# Patient Record
Sex: FEMALE | Race: WHITE | NOT HISPANIC OR LATINO | Employment: UNEMPLOYED | RURAL
[De-identification: names, ages, dates, MRNs, and addresses within clinical notes are randomized per-mention and may not be internally consistent; named-entity substitution may affect disease eponyms.]

---

## 2017-02-01 ENCOUNTER — HISTORICAL (OUTPATIENT)
Dept: ADMINISTRATIVE | Facility: HOSPITAL | Age: 47
End: 2017-02-01

## 2017-02-03 LAB
LAB AP COMMENTS: NORMAL
LAB AP GENERAL CAT - HISTORICAL: NORMAL
LAB AP INTERPRETATION/RESULT - HISTORICAL: NEGATIVE
LAB AP SPECIMEN ADEQUACY - HISTORICAL: NORMAL
LAB AP SPECIMEN SUBMITTED - HISTORICAL: NORMAL

## 2020-03-16 ENCOUNTER — HISTORICAL (OUTPATIENT)
Dept: ADMINISTRATIVE | Facility: HOSPITAL | Age: 50
End: 2020-03-16

## 2020-03-16 LAB
BASOPHILS # BLD AUTO: 0.09 X10E3/UL (ref 0–0.2)
BASOPHILS NFR BLD AUTO: 0.8 % (ref 0–1)
BILIRUB UR QL STRIP: NEGATIVE MG/DL
BUN SERPL-MCNC: 17 MG/DL (ref 7–18)
CALCIUM SERPL-MCNC: 8.7 MG/DL (ref 8.5–10.1)
CHLORIDE SERPL-SCNC: 103 MMOL/L (ref 98–107)
CLARITY UR: CLEAR
CO2 SERPL-SCNC: 24 MMOL/L (ref 21–32)
COLOR UR: YELLOW
CREAT SERPL-MCNC: 1.15 MG/DL (ref 0.55–1.02)
EOSINOPHIL # BLD AUTO: 0.17 X10E3/UL (ref 0–0.5)
EOSINOPHIL NFR BLD AUTO: 1.5 % (ref 1–4)
ERYTHROCYTE [DISTWIDTH] IN BLOOD BY AUTOMATED COUNT: 12 % (ref 11.5–14.5)
GLUCOSE SERPL-MCNC: 162 MG/DL (ref 74–106)
GLUCOSE UR STRIP-MCNC: NEGATIVE MG/DL
HCT VFR BLD AUTO: 42.4 % (ref 38–47)
HGB BLD-MCNC: 13.9 G/DL (ref 12–16)
IMM GRANULOCYTES # BLD AUTO: 0.08 X10E3/UL (ref 0–0.04)
IMM GRANULOCYTES NFR BLD: 0.7 % (ref 0–0.4)
KETONES UR STRIP-SCNC: NEGATIVE MG/DL
LEUKOCYTE ESTERASE UR QL STRIP: NEGATIVE LEU/UL
LYMPHOCYTES # BLD AUTO: 2.17 X10E3/UL (ref 1–4.8)
LYMPHOCYTES NFR BLD AUTO: 18.8 % (ref 27–41)
MCH RBC QN AUTO: 29.4 PG (ref 27–31)
MCHC RBC AUTO-ENTMCNC: 32.8 G/DL (ref 32–36)
MCV RBC AUTO: 89.6 FL (ref 80–96)
MONOCYTES # BLD AUTO: 0.9 X10E3/UL (ref 0–0.8)
MONOCYTES NFR BLD AUTO: 7.8 % (ref 2–6)
MPC BLD CALC-MCNC: 10.8 FL (ref 9.4–12.4)
NEUTROPHILS # BLD AUTO: 8.16 X10E3/UL (ref 1.8–7.7)
NEUTROPHILS NFR BLD AUTO: 70.4 % (ref 53–65)
NITRITE UR QL STRIP: NEGATIVE
NRBC # BLD AUTO: 0 X10E3/UL (ref 0–0)
NRBC, AUTO (.00): 0 /100 (ref 0–0)
PH UR STRIP: 5.5 PH UNITS (ref 5–8)
PLATELET # BLD AUTO: 254 X10E3/UL (ref 150–400)
POTASSIUM SERPL-SCNC: 3.6 MMOL/L (ref 3.5–5.1)
PROT UR QL STRIP: NEGATIVE MG/DL
RBC # BLD AUTO: 4.73 X10E6/UL (ref 4.2–5.4)
RBC # UR STRIP: ABNORMAL ERY/UL
SODIUM SERPL-SCNC: 136 MMOL/L (ref 136–145)
SP GR UR STRIP: >=1.03 (ref 1–1.03)
UROBILINOGEN UR STRIP-ACNC: 0.2 EU/DL
WBC # BLD AUTO: 11.57 X10E3/UL (ref 4.5–11)

## 2020-05-21 ENCOUNTER — HISTORICAL (OUTPATIENT)
Dept: ADMINISTRATIVE | Facility: HOSPITAL | Age: 50
End: 2020-05-21

## 2020-05-23 LAB
REPORT: NORMAL

## 2020-07-31 ENCOUNTER — HISTORICAL (OUTPATIENT)
Dept: ADMINISTRATIVE | Facility: HOSPITAL | Age: 50
End: 2020-07-31

## 2020-07-31 LAB — SARS-COV+SARS-COV-2 AG RESP QL IA.RAPID: NEGATIVE

## 2020-09-04 ENCOUNTER — HISTORICAL (OUTPATIENT)
Dept: ADMINISTRATIVE | Facility: HOSPITAL | Age: 50
End: 2020-09-04

## 2020-09-04 LAB — SARS-COV+SARS-COV-2 AG RESP QL IA.RAPID: NEGATIVE

## 2021-02-22 ENCOUNTER — HISTORICAL (OUTPATIENT)
Dept: ADMINISTRATIVE | Facility: HOSPITAL | Age: 51
End: 2021-02-22

## 2021-04-19 ENCOUNTER — OFFICE VISIT (OUTPATIENT)
Dept: FAMILY MEDICINE | Facility: CLINIC | Age: 51
End: 2021-04-19
Payer: COMMERCIAL

## 2021-04-19 VITALS
BODY MASS INDEX: 29.15 KG/M2 | SYSTOLIC BLOOD PRESSURE: 121 MMHG | HEART RATE: 81 BPM | OXYGEN SATURATION: 98 % | HEIGHT: 69 IN | RESPIRATION RATE: 17 BRPM | DIASTOLIC BLOOD PRESSURE: 72 MMHG | WEIGHT: 196.81 LBS | TEMPERATURE: 98 F

## 2021-04-19 DIAGNOSIS — J02.9 SORE THROAT: Primary | ICD-10-CM

## 2021-04-19 PROBLEM — M50.20 HERNIATED CERVICAL DISC: Status: ACTIVE | Noted: 2018-05-15

## 2021-04-19 LAB
CTP QC/QA: YES
S PYO RRNA THROAT QL PROBE: NEGATIVE

## 2021-04-19 PROCEDURE — 96372 THER/PROPH/DIAG INJ SC/IM: CPT | Mod: ,,, | Performed by: NURSE PRACTITIONER

## 2021-04-19 PROCEDURE — 99213 OFFICE O/P EST LOW 20 MIN: CPT | Mod: 25,,, | Performed by: NURSE PRACTITIONER

## 2021-04-19 PROCEDURE — 96372 PR INJECTION,THERAP/PROPH/DIAG2ST, IM OR SUBCUT: ICD-10-PCS | Mod: ,,, | Performed by: NURSE PRACTITIONER

## 2021-04-19 PROCEDURE — 87880 POCT RAPID STREP A: ICD-10-PCS | Mod: QW,,, | Performed by: NURSE PRACTITIONER

## 2021-04-19 PROCEDURE — 87880 STREP A ASSAY W/OPTIC: CPT | Mod: QW,,, | Performed by: NURSE PRACTITIONER

## 2021-04-19 PROCEDURE — 99213 PR OFFICE/OUTPT VISIT, EST, LEVL III, 20-29 MIN: ICD-10-PCS | Mod: 25,,, | Performed by: NURSE PRACTITIONER

## 2021-04-19 RX ORDER — DEXAMETHASONE SODIUM PHOSPHATE 4 MG/ML
6 INJECTION, SOLUTION INTRA-ARTICULAR; INTRALESIONAL; INTRAMUSCULAR; INTRAVENOUS; SOFT TISSUE
Status: COMPLETED | OUTPATIENT
Start: 2021-04-19 | End: 2021-04-19

## 2021-04-19 RX ORDER — ESCITALOPRAM OXALATE 20 MG/1
TABLET ORAL
COMMUNITY
Start: 2021-02-15 | End: 2021-11-26

## 2021-04-19 RX ORDER — MELOXICAM 15 MG/1
15 TABLET ORAL DAILY
COMMUNITY

## 2021-04-19 RX ORDER — DULOXETIN HYDROCHLORIDE 30 MG/1
30 CAPSULE, DELAYED RELEASE ORAL DAILY
COMMUNITY
End: 2022-04-25

## 2021-04-19 RX ORDER — GABAPENTIN 100 MG/1
300 CAPSULE ORAL NIGHTLY
COMMUNITY
End: 2022-06-16

## 2021-04-19 RX ORDER — BUSPIRONE HYDROCHLORIDE 7.5 MG/1
TABLET ORAL
COMMUNITY
Start: 2021-02-19 | End: 2021-11-26

## 2021-04-19 RX ORDER — DEXAMETHASONE SODIUM PHOSPHATE 4 MG/ML
6 INJECTION, SOLUTION INTRA-ARTICULAR; INTRALESIONAL; INTRAMUSCULAR; INTRAVENOUS; SOFT TISSUE
Status: DISCONTINUED | OUTPATIENT
Start: 2021-04-19 | End: 2021-04-19

## 2021-04-19 RX ORDER — AMOXICILLIN 500 MG/1
500 CAPSULE ORAL 2 TIMES DAILY
Qty: 20 CAPSULE | Refills: 0 | Status: SHIPPED | OUTPATIENT
Start: 2021-04-19 | End: 2021-11-26

## 2021-04-19 RX ADMIN — DEXAMETHASONE SODIUM PHOSPHATE 6 MG: 4 INJECTION, SOLUTION INTRA-ARTICULAR; INTRALESIONAL; INTRAMUSCULAR; INTRAVENOUS; SOFT TISSUE at 07:04

## 2021-11-06 ENCOUNTER — OFFICE VISIT (OUTPATIENT)
Dept: FAMILY MEDICINE | Facility: CLINIC | Age: 51
End: 2021-11-06
Payer: COMMERCIAL

## 2021-11-06 VITALS
SYSTOLIC BLOOD PRESSURE: 120 MMHG | OXYGEN SATURATION: 98 % | WEIGHT: 188 LBS | TEMPERATURE: 97 F | BODY MASS INDEX: 27.85 KG/M2 | HEIGHT: 69 IN | DIASTOLIC BLOOD PRESSURE: 78 MMHG | HEART RATE: 90 BPM

## 2021-11-06 DIAGNOSIS — J06.9 UPPER RESPIRATORY TRACT INFECTION, UNSPECIFIED TYPE: ICD-10-CM

## 2021-11-06 DIAGNOSIS — Z11.52 ENCOUNTER FOR SCREENING LABORATORY TESTING FOR COVID-19 VIRUS: Primary | ICD-10-CM

## 2021-11-06 DIAGNOSIS — J02.9 SORE THROAT: ICD-10-CM

## 2021-11-06 LAB
CTP QC/QA: YES
CTP QC/QA: YES
FLUAV AG NPH QL: NEGATIVE
FLUBV AG NPH QL: NEGATIVE
S PYO RRNA THROAT QL PROBE: NEGATIVE
SARS-COV-2 AG RESP QL IA.RAPID: NEGATIVE

## 2021-11-06 PROCEDURE — 1159F PR MEDICATION LIST DOCUMENTED IN MEDICAL RECORD: ICD-10-PCS | Mod: ,,, | Performed by: NURSE PRACTITIONER

## 2021-11-06 PROCEDURE — 99051 PR MEDICAL SERVICES, EVE/WKEND/HOLIDAY: ICD-10-PCS | Mod: ,,, | Performed by: NURSE PRACTITIONER

## 2021-11-06 PROCEDURE — 3074F PR MOST RECENT SYSTOLIC BLOOD PRESSURE < 130 MM HG: ICD-10-PCS | Mod: ,,, | Performed by: NURSE PRACTITIONER

## 2021-11-06 PROCEDURE — 87880 STREP A ASSAY W/OPTIC: CPT | Mod: QW,,, | Performed by: NURSE PRACTITIONER

## 2021-11-06 PROCEDURE — 99051 MED SERV EVE/WKEND/HOLIDAY: CPT | Mod: ,,, | Performed by: NURSE PRACTITIONER

## 2021-11-06 PROCEDURE — 87428 SARSCOV & INF VIR A&B AG IA: CPT | Mod: QW,,, | Performed by: NURSE PRACTITIONER

## 2021-11-06 PROCEDURE — 87880 POCT RAPID STREP A: ICD-10-PCS | Mod: QW,,, | Performed by: NURSE PRACTITIONER

## 2021-11-06 PROCEDURE — 96372 PR INJECTION,THERAP/PROPH/DIAG2ST, IM OR SUBCUT: ICD-10-PCS | Mod: ,,, | Performed by: NURSE PRACTITIONER

## 2021-11-06 PROCEDURE — 3074F SYST BP LT 130 MM HG: CPT | Mod: ,,, | Performed by: NURSE PRACTITIONER

## 2021-11-06 PROCEDURE — 1159F MED LIST DOCD IN RCRD: CPT | Mod: ,,, | Performed by: NURSE PRACTITIONER

## 2021-11-06 PROCEDURE — 3078F DIAST BP <80 MM HG: CPT | Mod: ,,, | Performed by: NURSE PRACTITIONER

## 2021-11-06 PROCEDURE — 3078F PR MOST RECENT DIASTOLIC BLOOD PRESSURE < 80 MM HG: ICD-10-PCS | Mod: ,,, | Performed by: NURSE PRACTITIONER

## 2021-11-06 PROCEDURE — 1160F PR REVIEW ALL MEDS BY PRESCRIBER/CLIN PHARMACIST DOCUMENTED: ICD-10-PCS | Mod: ,,, | Performed by: NURSE PRACTITIONER

## 2021-11-06 PROCEDURE — 87428 POCT SARS-COV2 (COVID) WITH FLU ANTIGEN: ICD-10-PCS | Mod: QW,,, | Performed by: NURSE PRACTITIONER

## 2021-11-06 PROCEDURE — 3008F PR BODY MASS INDEX (BMI) DOCUMENTED: ICD-10-PCS | Mod: ,,, | Performed by: NURSE PRACTITIONER

## 2021-11-06 PROCEDURE — 96372 THER/PROPH/DIAG INJ SC/IM: CPT | Mod: ,,, | Performed by: NURSE PRACTITIONER

## 2021-11-06 PROCEDURE — 99213 PR OFFICE/OUTPT VISIT, EST, LEVL III, 20-29 MIN: ICD-10-PCS | Mod: 25,,, | Performed by: NURSE PRACTITIONER

## 2021-11-06 PROCEDURE — 3008F BODY MASS INDEX DOCD: CPT | Mod: ,,, | Performed by: NURSE PRACTITIONER

## 2021-11-06 PROCEDURE — 1160F RVW MEDS BY RX/DR IN RCRD: CPT | Mod: ,,, | Performed by: NURSE PRACTITIONER

## 2021-11-06 PROCEDURE — 99213 OFFICE O/P EST LOW 20 MIN: CPT | Mod: 25,,, | Performed by: NURSE PRACTITIONER

## 2021-11-06 RX ORDER — DEXAMETHASONE SODIUM PHOSPHATE 4 MG/ML
6 INJECTION, SOLUTION INTRA-ARTICULAR; INTRALESIONAL; INTRAMUSCULAR; INTRAVENOUS; SOFT TISSUE
Status: COMPLETED | OUTPATIENT
Start: 2021-11-06 | End: 2021-11-06

## 2021-11-06 RX ADMIN — DEXAMETHASONE SODIUM PHOSPHATE 6 MG: 4 INJECTION, SOLUTION INTRA-ARTICULAR; INTRALESIONAL; INTRAMUSCULAR; INTRAVENOUS; SOFT TISSUE at 11:11

## 2021-11-26 ENCOUNTER — OFFICE VISIT (OUTPATIENT)
Dept: FAMILY MEDICINE | Facility: CLINIC | Age: 51
End: 2021-11-26
Payer: COMMERCIAL

## 2021-11-26 VITALS
RESPIRATION RATE: 18 BRPM | WEIGHT: 188 LBS | TEMPERATURE: 97 F | BODY MASS INDEX: 27.85 KG/M2 | SYSTOLIC BLOOD PRESSURE: 134 MMHG | HEART RATE: 86 BPM | DIASTOLIC BLOOD PRESSURE: 76 MMHG | HEIGHT: 69 IN | OXYGEN SATURATION: 99 %

## 2021-11-26 DIAGNOSIS — J20.9 ACUTE BRONCHITIS, UNSPECIFIED ORGANISM: ICD-10-CM

## 2021-11-26 DIAGNOSIS — Z20.828 CONTACT WITH OR EXPOSURE TO VIRAL DISEASE: Primary | ICD-10-CM

## 2021-11-26 LAB
CTP QC/QA: YES
FLUAV AG NPH QL: NEGATIVE
FLUBV AG NPH QL: NEGATIVE
SARS-COV-2 AG RESP QL IA.RAPID: NEGATIVE

## 2021-11-26 PROCEDURE — 87428 POCT SARS-COV2 (COVID) WITH FLU ANTIGEN: ICD-10-PCS | Mod: QW,,, | Performed by: NURSE PRACTITIONER

## 2021-11-26 PROCEDURE — 96372 THER/PROPH/DIAG INJ SC/IM: CPT | Mod: ,,, | Performed by: NURSE PRACTITIONER

## 2021-11-26 PROCEDURE — 99213 PR OFFICE/OUTPT VISIT, EST, LEVL III, 20-29 MIN: ICD-10-PCS | Mod: 25,,, | Performed by: NURSE PRACTITIONER

## 2021-11-26 PROCEDURE — 96372 PR INJECTION,THERAP/PROPH/DIAG2ST, IM OR SUBCUT: ICD-10-PCS | Mod: ,,, | Performed by: NURSE PRACTITIONER

## 2021-11-26 PROCEDURE — 87428 SARSCOV & INF VIR A&B AG IA: CPT | Mod: QW,,, | Performed by: NURSE PRACTITIONER

## 2021-11-26 PROCEDURE — 99213 OFFICE O/P EST LOW 20 MIN: CPT | Mod: 25,,, | Performed by: NURSE PRACTITIONER

## 2021-11-26 RX ORDER — CEFTRIAXONE 1 G/1
1 INJECTION, POWDER, FOR SOLUTION INTRAMUSCULAR; INTRAVENOUS
Status: COMPLETED | OUTPATIENT
Start: 2021-11-26 | End: 2021-11-26

## 2021-11-26 RX ORDER — CEFDINIR 300 MG/1
300 CAPSULE ORAL 2 TIMES DAILY
Qty: 20 CAPSULE | Refills: 0 | Status: SHIPPED | OUTPATIENT
Start: 2021-11-26 | End: 2021-12-06

## 2021-11-26 RX ORDER — PREDNISONE 20 MG/1
20 TABLET ORAL DAILY
Qty: 5 TABLET | Refills: 0 | Status: SHIPPED | OUTPATIENT
Start: 2021-11-26 | End: 2023-01-21

## 2021-11-26 RX ORDER — DEXAMETHASONE SODIUM PHOSPHATE 4 MG/ML
6 INJECTION, SOLUTION INTRA-ARTICULAR; INTRALESIONAL; INTRAMUSCULAR; INTRAVENOUS; SOFT TISSUE ONCE
Status: COMPLETED | OUTPATIENT
Start: 2021-11-26 | End: 2021-11-26

## 2021-11-26 RX ORDER — CHLOPHEDIANOL HCL AND PYRILAMINE MALEATE 12.5; 12.5 MG/5ML; MG/5ML
10 SOLUTION ORAL 3 TIMES DAILY
Qty: 200 ML | Refills: 0 | Status: SHIPPED | OUTPATIENT
Start: 2021-11-26 | End: 2021-12-01

## 2021-11-26 RX ADMIN — CEFTRIAXONE 1 G: 1 INJECTION, POWDER, FOR SOLUTION INTRAMUSCULAR; INTRAVENOUS at 09:11

## 2021-11-26 RX ADMIN — DEXAMETHASONE SODIUM PHOSPHATE 6 MG: 4 INJECTION, SOLUTION INTRA-ARTICULAR; INTRALESIONAL; INTRAMUSCULAR; INTRAVENOUS; SOFT TISSUE at 09:11

## 2022-02-28 ENCOUNTER — HOSPITAL ENCOUNTER (OUTPATIENT)
Dept: RADIOLOGY | Facility: HOSPITAL | Age: 52
Discharge: HOME | End: 2022-02-28
Payer: COMMERCIAL

## 2022-02-28 VITALS — HEIGHT: 69 IN | BODY MASS INDEX: 27.85 KG/M2 | WEIGHT: 188 LBS

## 2022-02-28 DIAGNOSIS — Z12.31 VISIT FOR SCREENING MAMMOGRAM: ICD-10-CM

## 2022-02-28 PROCEDURE — 77067 SCR MAMMO BI INCL CAD: CPT | Mod: TC

## 2022-04-25 ENCOUNTER — HOSPITAL ENCOUNTER (OUTPATIENT)
Dept: RADIOLOGY | Facility: HOSPITAL | Age: 52
Discharge: HOME | End: 2022-04-25
Attending: ORTHOPAEDIC SURGERY
Payer: COMMERCIAL

## 2022-04-25 DIAGNOSIS — M25.562 ACUTE PAIN OF LEFT KNEE: ICD-10-CM

## 2022-04-25 PROBLEM — M23.92 INTERNAL DERANGEMENT OF LEFT KNEE: Status: ACTIVE | Noted: 2022-04-25

## 2022-04-25 PROCEDURE — 73562 X-RAY EXAM OF KNEE 3: CPT | Mod: TC,LT

## 2022-05-10 ENCOUNTER — OFFICE VISIT (OUTPATIENT)
Dept: FAMILY MEDICINE | Facility: CLINIC | Age: 52
End: 2022-05-10
Payer: COMMERCIAL

## 2022-05-10 VITALS
HEART RATE: 85 BPM | BODY MASS INDEX: 29.18 KG/M2 | DIASTOLIC BLOOD PRESSURE: 79 MMHG | TEMPERATURE: 98 F | OXYGEN SATURATION: 98 % | HEIGHT: 69 IN | WEIGHT: 197 LBS | SYSTOLIC BLOOD PRESSURE: 115 MMHG

## 2022-05-10 DIAGNOSIS — R11.2 NAUSEA AND VOMITING, INTRACTABILITY OF VOMITING NOT SPECIFIED, UNSPECIFIED VOMITING TYPE: ICD-10-CM

## 2022-05-10 DIAGNOSIS — R19.7 DIARRHEA, UNSPECIFIED TYPE: ICD-10-CM

## 2022-05-10 DIAGNOSIS — Z20.828 CONTACT WITH OR EXPOSURE TO VIRAL DISEASE: Primary | ICD-10-CM

## 2022-05-10 PROBLEM — R11.0 NAUSEA: Status: ACTIVE | Noted: 2022-05-10

## 2022-05-10 PROCEDURE — 99051 PR MEDICAL SERVICES, EVE/WKEND/HOLIDAY: ICD-10-PCS | Mod: ,,, | Performed by: NURSE PRACTITIONER

## 2022-05-10 PROCEDURE — 99213 PR OFFICE/OUTPT VISIT, EST, LEVL III, 20-29 MIN: ICD-10-PCS | Mod: ,,, | Performed by: NURSE PRACTITIONER

## 2022-05-10 PROCEDURE — 99213 OFFICE O/P EST LOW 20 MIN: CPT | Mod: ,,, | Performed by: NURSE PRACTITIONER

## 2022-05-10 PROCEDURE — 87428 POCT SARS-COV2 (COVID) WITH FLU ANTIGEN: ICD-10-PCS | Mod: QW,,, | Performed by: NURSE PRACTITIONER

## 2022-05-10 PROCEDURE — 3008F PR BODY MASS INDEX (BMI) DOCUMENTED: ICD-10-PCS | Mod: ,,, | Performed by: NURSE PRACTITIONER

## 2022-05-10 PROCEDURE — 3078F DIAST BP <80 MM HG: CPT | Mod: ,,, | Performed by: NURSE PRACTITIONER

## 2022-05-10 PROCEDURE — S0119 ONDANSETRON 4 MG: HCPCS | Mod: ,,, | Performed by: NURSE PRACTITIONER

## 2022-05-10 PROCEDURE — 3008F BODY MASS INDEX DOCD: CPT | Mod: ,,, | Performed by: NURSE PRACTITIONER

## 2022-05-10 PROCEDURE — 3074F PR MOST RECENT SYSTOLIC BLOOD PRESSURE < 130 MM HG: ICD-10-PCS | Mod: ,,, | Performed by: NURSE PRACTITIONER

## 2022-05-10 PROCEDURE — 99051 MED SERV EVE/WKEND/HOLIDAY: CPT | Mod: ,,, | Performed by: NURSE PRACTITIONER

## 2022-05-10 PROCEDURE — S0119 PR ONDANSETRON, ORAL, 4MG: ICD-10-PCS | Mod: ,,, | Performed by: NURSE PRACTITIONER

## 2022-05-10 PROCEDURE — 3078F PR MOST RECENT DIASTOLIC BLOOD PRESSURE < 80 MM HG: ICD-10-PCS | Mod: ,,, | Performed by: NURSE PRACTITIONER

## 2022-05-10 PROCEDURE — 87428 SARSCOV & INF VIR A&B AG IA: CPT | Mod: QW,,, | Performed by: NURSE PRACTITIONER

## 2022-05-10 PROCEDURE — 3074F SYST BP LT 130 MM HG: CPT | Mod: ,,, | Performed by: NURSE PRACTITIONER

## 2022-05-10 RX ORDER — ONDANSETRON 4 MG/1
4 TABLET, ORALLY DISINTEGRATING ORAL EVERY 6 HOURS PRN
Qty: 20 TABLET | Refills: 0 | Status: SHIPPED | OUTPATIENT
Start: 2022-05-10 | End: 2023-01-21

## 2022-05-10 RX ORDER — ONDANSETRON 4 MG/1
8 TABLET, FILM COATED ORAL ONCE
Status: COMPLETED | OUTPATIENT
Start: 2022-05-10 | End: 2022-05-10

## 2022-05-10 RX ORDER — SERTRALINE HYDROCHLORIDE 50 MG/1
50 TABLET, FILM COATED ORAL DAILY
COMMUNITY
End: 2023-09-08

## 2022-05-10 RX ADMIN — ONDANSETRON 8 MG: 4 TABLET, FILM COATED ORAL at 06:05

## 2022-05-10 NOTE — PROGRESS NOTES
Subjective:       Patient ID: Benita Alston is a 51 y.o. female.    Chief Complaint: Diarrhea, Fever, and Nausea    Nausea, vomiting and diarrhea with low grade fever x day    Review of Systems   Constitutional: Positive for fever. Negative for appetite change and fatigue.   HENT: Negative for nasal congestion, ear pain and sore throat.    Eyes: Negative for pain, discharge and itching.   Respiratory: Negative for cough and shortness of breath.    Cardiovascular: Negative for chest pain and leg swelling.   Gastrointestinal: Positive for diarrhea, nausea and vomiting. Negative for abdominal pain, change in bowel habit and change in bowel habit.   Musculoskeletal: Negative for back pain, gait problem and neck pain.   Integumentary:  Negative for rash and wound.   Allergic/Immunologic: Negative for immunocompromised state.   Neurological: Negative for dizziness, weakness and headaches.   All other systems reviewed and are negative.        Objective:      Physical Exam  Vitals and nursing note reviewed.   Constitutional:       General: She is not in acute distress.     Appearance: Normal appearance. She is not ill-appearing, toxic-appearing or diaphoretic.   HENT:      Head: Normocephalic.      Right Ear: Tympanic membrane, ear canal and external ear normal.      Left Ear: Tympanic membrane, ear canal and external ear normal.      Nose: Nose normal. No congestion or rhinorrhea.      Mouth/Throat:      Mouth: Mucous membranes are moist.      Pharynx: No oropharyngeal exudate or posterior oropharyngeal erythema.   Eyes:      General: No scleral icterus.        Right eye: No discharge.         Left eye: No discharge.      Extraocular Movements: Extraocular movements intact.      Conjunctiva/sclera: Conjunctivae normal.      Pupils: Pupils are equal, round, and reactive to light.   Cardiovascular:      Rate and Rhythm: Normal rate and regular rhythm.      Pulses: Normal pulses.      Heart sounds: Normal heart sounds. No  murmur heard.  Pulmonary:      Effort: Pulmonary effort is normal. No respiratory distress.      Breath sounds: Normal breath sounds. No wheezing, rhonchi or rales.   Abdominal:      General: Bowel sounds are increased.      Tenderness: There is no abdominal tenderness. There is no guarding.   Musculoskeletal:         General: Normal range of motion.      Cervical back: Neck supple. No tenderness.   Lymphadenopathy:      Cervical: No cervical adenopathy.   Skin:     General: Skin is warm and dry.      Capillary Refill: Capillary refill takes less than 2 seconds.      Findings: No rash.   Neurological:      Mental Status: She is alert and oriented to person, place, and time.   Psychiatric:         Mood and Affect: Mood normal.         Behavior: Behavior normal.         Thought Content: Thought content normal.         Judgment: Judgment normal.         Office Visit on 05/10/2022   Component Date Value Ref Range Status    SARS Coronavirus 2 Antigen 05/10/2022 Negative  Negative Final    Rapid Influenza A Ag 05/10/2022 Negative  Negative Final    Rapid Influenza B Ag 05/10/2022 Negative  Negative Final     Acceptable 05/10/2022 Yes   Final   Office Visit on 11/26/2021   Component Date Value Ref Range Status    SARS Coronavirus 2 Antigen 11/26/2021 Negative  Negative Final    Rapid Influenza A Ag 11/26/2021 Negative  Negative Final    Rapid Influenza B Ag 11/26/2021 Negative  Negative Final     Acceptable 11/26/2021 Yes   Final      Assessment:       1. Contact with or exposure to viral disease    2. Nausea and vomiting, intractability of vomiting not specified, unspecified vomiting type    3. Diarrhea, unspecified type        Plan:   Contact with or exposure to viral disease  -     POCT SARS-COV2 (COVID) with Flu Antigen    Nausea and vomiting, intractability of vomiting not specified, unspecified vomiting type  -     ondansetron tablet 8 mg  -     ondansetron (ZOFRAN-ODT) 4 MG  TbDL; Take 1 tablet (4 mg total) by mouth every 6 (six) hours as needed (nausea).  Dispense: 20 tablet; Refill: 0    Diarrhea, unspecified type

## 2022-06-16 ENCOUNTER — OFFICE VISIT (OUTPATIENT)
Dept: RHEUMATOLOGY | Facility: CLINIC | Age: 52
End: 2022-06-16
Payer: COMMERCIAL

## 2022-06-16 VITALS
DIASTOLIC BLOOD PRESSURE: 72 MMHG | HEART RATE: 95 BPM | OXYGEN SATURATION: 97 % | BODY MASS INDEX: 29.39 KG/M2 | RESPIRATION RATE: 16 BRPM | SYSTOLIC BLOOD PRESSURE: 130 MMHG | WEIGHT: 199 LBS

## 2022-06-16 DIAGNOSIS — M54.89 BACK PAIN DUE TO INFLAMMATORY PROCESS: ICD-10-CM

## 2022-06-16 DIAGNOSIS — M50.123 CERVICAL DISC DISORDER AT C6-C7 LEVEL WITH RADICULOPATHY: Primary | ICD-10-CM

## 2022-06-16 PROCEDURE — 1160F PR REVIEW ALL MEDS BY PRESCRIBER/CLIN PHARMACIST DOCUMENTED: ICD-10-PCS | Mod: ,,, | Performed by: INTERNAL MEDICINE

## 2022-06-16 PROCEDURE — 3008F PR BODY MASS INDEX (BMI) DOCUMENTED: ICD-10-PCS | Mod: ,,, | Performed by: INTERNAL MEDICINE

## 2022-06-16 PROCEDURE — 1160F RVW MEDS BY RX/DR IN RCRD: CPT | Mod: ,,, | Performed by: INTERNAL MEDICINE

## 2022-06-16 PROCEDURE — 1159F PR MEDICATION LIST DOCUMENTED IN MEDICAL RECORD: ICD-10-PCS | Mod: ,,, | Performed by: INTERNAL MEDICINE

## 2022-06-16 PROCEDURE — 99205 OFFICE O/P NEW HI 60 MIN: CPT | Mod: S$PBB,,, | Performed by: INTERNAL MEDICINE

## 2022-06-16 PROCEDURE — 1159F MED LIST DOCD IN RCRD: CPT | Mod: ,,, | Performed by: INTERNAL MEDICINE

## 2022-06-16 PROCEDURE — 3075F PR MOST RECENT SYSTOLIC BLOOD PRESS GE 130-139MM HG: ICD-10-PCS | Mod: ,,, | Performed by: INTERNAL MEDICINE

## 2022-06-16 PROCEDURE — 3078F PR MOST RECENT DIASTOLIC BLOOD PRESSURE < 80 MM HG: ICD-10-PCS | Mod: ,,, | Performed by: INTERNAL MEDICINE

## 2022-06-16 PROCEDURE — 3008F BODY MASS INDEX DOCD: CPT | Mod: ,,, | Performed by: INTERNAL MEDICINE

## 2022-06-16 PROCEDURE — 3075F SYST BP GE 130 - 139MM HG: CPT | Mod: ,,, | Performed by: INTERNAL MEDICINE

## 2022-06-16 PROCEDURE — 3078F DIAST BP <80 MM HG: CPT | Mod: ,,, | Performed by: INTERNAL MEDICINE

## 2022-06-16 PROCEDURE — 99205 PR OFFICE/OUTPT VISIT, NEW, LEVL V, 60-74 MIN: ICD-10-PCS | Mod: S$PBB,,, | Performed by: INTERNAL MEDICINE

## 2022-06-16 PROCEDURE — 99214 OFFICE O/P EST MOD 30 MIN: CPT | Mod: PBBFAC | Performed by: INTERNAL MEDICINE

## 2022-06-16 RX ORDER — GABAPENTIN 300 MG/1
300 CAPSULE ORAL NIGHTLY
COMMUNITY
Start: 2022-06-13

## 2022-06-16 NOTE — PROGRESS NOTES
Adali Otero MD   [facility] - RHEUMATOLOGY  [address] [place] [address]  [phone]      PATIENT NAME: Benita Alston  : 1970  DATE: 22  MRN: [id number]      Billing Provider: Adali Otero MD  Level of Service:   Patient PCP Information     Provider PCP Tanya Snyder MD General          Reason for Visit / Chief Complaint: Arthritis (Previously seen by Dr Joshi at [facility] for fibromyalgia and arthritis/)           Assessment and Plan (including Health Maintenance)      Problem List  Smart Sets  Document Outside HM   :    Plan:     Benita Alston is a 46-year-old female with significant cervical neck stiffness.  During our encounter today she had barely moved her neck.  She also has had C6-C7 ACDF.  Cervical radicular symptoms on the left side are somewhat amenable to gabapentin use.  She is on low-dose 300 mg q.h.s..  Have asked her to continue this regimen until she establishes with either Dr. Grace or Dr. Mayers with pain clinic.  I want to investigate for an HLA-b27 driven spondyloarthritis. Will start with angled up SI joint radiographs.   If negative, could consider non-radiographic SpA as dx and consider cimzia since she is responsive to mobic.   APRs pending.       ICD-10-CM ICD-9-CM    1. Cervical disc disorder at C6-C7 level with radiculopathy  M50.123 722.91 Ambulatory referral/consult to Pain Clinic     723.4    2. Back pain due to inflammatory process  M54.89 724.5 HLA-B27 antigen      Hepatitis C Antibody      Hepatitis B Surface Antigen      Hepatitis B Surface Ab, Qualitative      Quantiferon Gold TB      Cyclic Citrullinated Peptide Antibody, IgG      Rheumatoid Quantitative      C-Reactive Protein      Sedimentation Rate      Uric Acid      X-Ray Sacroiliac Joints Complete           Total time spent in Assessment, Co-ordination of Care , Review of Care Plan , Goal Setting and Counseling on this initial visit is ~ 60 minutes                History of Present Illness / Problem Focused Workflow     Benita Alston presents to the clinic with Arthritis (Previously seen by Dr Joshi at [facility] for fibromyalgia and arthritis/)     Worsening back and neck pain for 10 years. Experiences a lot of nerve pain on the left side sometimes radiating from the neck. Has not undergone any neck or epidural injections. Takes gabapentin 300mg QHS. Has been on this dose for a month. Has helped. Prescribed by Chely Morse at [place].   Has also tried mobic which has helped.   2018 underwent c6-7 ACDF in [place]. This did relieve her left sided radicular symptoms greatly.    strength is good.   No weakness.      Review of Systems     Review of Systems   Constitutional: Negative for fever.   Eyes: Negative for redness.   Respiratory: Negative for cough and shortness of breath.    Cardiovascular: Negative for chest pain.   Gastrointestinal: Positive for constipation. Negative for diarrhea.   Genitourinary: Negative for dysuria.   Skin: Negative for rash.   Neurological: Negative for headaches.   Endo/Heme/Allergies: Does not bruise/bleed easily.     Review of Systems   Constitutional: Negative for fever.   Eyes: Negative for redness.   Respiratory: Negative for cough and shortness of breath.    Cardiovascular: Negative for chest pain.   Gastrointestinal: Positive for constipation. Negative for diarrhea.   Genitourinary: Negative for dysuria.   Skin: Negative for rash.   Neurological: Negative for headaches.   Hematological: Does not bruise/bleed easily.       Medical / Social / Family History   History reviewed. No pertinent past medical history.    Past Surgical History:   Procedure Laterality Date    HYSTERECTOMY      SPINE SURGERY         Social History  Ms. Benita Alston  reports that she has never smoked. She has never used smokeless tobacco. She reports that she does not drink alcohol and does not use drugs.    Family History  Ms.'s Benita Alston family  history includes Breast cancer in her mother; Diabetes Mellitus in her father; Hypertension in her mother.    Medications and Allergies     Medications  Outpatient Medications Marked as Taking for the 6/16/22 encounter (Office Visit) with Adali Otero MD   Medication Sig Dispense Refill    gabapentin (NEURONTIN) 300 MG capsule Take 300 mg by mouth nightly.      meloxicam (MOBIC) 15 MG tablet Take 15 mg by mouth once daily.      sertraline (ZOLOFT) 50 MG tablet Take 50 mg by mouth once daily.      [DISCONTINUED] gabapentin (NEURONTIN) 100 MG capsule Take 300 mg by mouth every evening.         Allergies  Review of patient's allergies indicates:  No Known Allergies    Physical Examination     Vitals:    06/16/22 1430   BP: 130/72   Pulse: 95   Resp: 16     Physical Exam  Musculoskeletal:      Comments: Cervical ROM is limited.                 Signature:  Adali Otero MD  [facility] - RHEUMATOLOGY  [address] [facility] [address]  [phone]    Date of encounter: 6/16/22

## 2022-06-21 ENCOUNTER — HOSPITAL ENCOUNTER (OUTPATIENT)
Dept: RADIOLOGY | Facility: HOSPITAL | Age: 52
Discharge: HOME | End: 2022-06-21
Attending: INTERNAL MEDICINE
Payer: COMMERCIAL

## 2022-06-21 DIAGNOSIS — M54.89 BACK PAIN DUE TO INFLAMMATORY PROCESS: ICD-10-CM

## 2022-06-21 PROCEDURE — 72202 XR SACROILIAC JOINTS COMPLETE: ICD-10-PCS | Mod: 26,,, | Performed by: RADIOLOGY

## 2022-06-21 PROCEDURE — 72202 X-RAY EXAM SI JOINTS 3/> VWS: CPT | Mod: TC

## 2022-06-21 PROCEDURE — 72202 X-RAY EXAM SI JOINTS 3/> VWS: CPT | Mod: 26,,, | Performed by: RADIOLOGY

## 2022-07-20 ENCOUNTER — OFFICE VISIT (OUTPATIENT)
Dept: RHEUMATOLOGY | Facility: CLINIC | Age: 52
End: 2022-07-20
Payer: COMMERCIAL

## 2022-07-20 VITALS
HEART RATE: 79 BPM | SYSTOLIC BLOOD PRESSURE: 126 MMHG | DIASTOLIC BLOOD PRESSURE: 74 MMHG | RESPIRATION RATE: 16 BRPM | OXYGEN SATURATION: 97 %

## 2022-07-20 DIAGNOSIS — Z15.89 HLA B27 (HLA B27 POSITIVE): ICD-10-CM

## 2022-07-20 DIAGNOSIS — D84.9 IMMUNOSUPPRESSION: ICD-10-CM

## 2022-07-20 DIAGNOSIS — M45.9 ANKYLOSING SPONDYLITIS, UNSPECIFIED SITE OF SPINE: ICD-10-CM

## 2022-07-20 DIAGNOSIS — M50.123 CERVICAL DISC DISORDER AT C6-C7 LEVEL WITH RADICULOPATHY: Primary | ICD-10-CM

## 2022-07-20 DIAGNOSIS — Z79.899 HIGH RISK MEDICATION USE: Primary | ICD-10-CM

## 2022-07-20 PROCEDURE — 99213 OFFICE O/P EST LOW 20 MIN: CPT | Mod: PBBFAC | Performed by: INTERNAL MEDICINE

## 2022-07-20 PROCEDURE — 1160F RVW MEDS BY RX/DR IN RCRD: CPT | Mod: ,,, | Performed by: INTERNAL MEDICINE

## 2022-07-20 PROCEDURE — 3078F PR MOST RECENT DIASTOLIC BLOOD PRESSURE < 80 MM HG: ICD-10-PCS | Mod: ,,, | Performed by: INTERNAL MEDICINE

## 2022-07-20 PROCEDURE — 1159F MED LIST DOCD IN RCRD: CPT | Mod: ,,, | Performed by: INTERNAL MEDICINE

## 2022-07-20 PROCEDURE — 3078F DIAST BP <80 MM HG: CPT | Mod: ,,, | Performed by: INTERNAL MEDICINE

## 2022-07-20 PROCEDURE — 99215 PR OFFICE/OUTPT VISIT, EST, LEVL V, 40-54 MIN: ICD-10-PCS | Mod: S$PBB,,, | Performed by: INTERNAL MEDICINE

## 2022-07-20 PROCEDURE — 1160F PR REVIEW ALL MEDS BY PRESCRIBER/CLIN PHARMACIST DOCUMENTED: ICD-10-PCS | Mod: ,,, | Performed by: INTERNAL MEDICINE

## 2022-07-20 PROCEDURE — 1159F PR MEDICATION LIST DOCUMENTED IN MEDICAL RECORD: ICD-10-PCS | Mod: ,,, | Performed by: INTERNAL MEDICINE

## 2022-07-20 PROCEDURE — 99215 OFFICE O/P EST HI 40 MIN: CPT | Mod: S$PBB,,, | Performed by: INTERNAL MEDICINE

## 2022-07-20 PROCEDURE — 3074F PR MOST RECENT SYSTOLIC BLOOD PRESSURE < 130 MM HG: ICD-10-PCS | Mod: ,,, | Performed by: INTERNAL MEDICINE

## 2022-07-20 PROCEDURE — 3074F SYST BP LT 130 MM HG: CPT | Mod: ,,, | Performed by: INTERNAL MEDICINE

## 2022-07-20 RX ORDER — METHYLPREDNISOLONE ACETATE 40 MG/ML
40 INJECTION, SUSPENSION INTRA-ARTICULAR; INTRALESIONAL; INTRAMUSCULAR; SOFT TISSUE
Status: DISCONTINUED | OUTPATIENT
Start: 2022-07-20 | End: 2022-07-21

## 2022-07-20 RX ORDER — ADALIMUMAB 40MG/0.4ML
40 KIT SUBCUTANEOUS
Qty: 2 EACH | Refills: 2 | Status: SHIPPED | OUTPATIENT
Start: 2022-07-20 | End: 2022-07-21 | Stop reason: SDUPTHER

## 2022-07-20 NOTE — PROGRESS NOTES
Adali Otero MD   [facility] - RHEUMATOLOGY  [address]  [place] [address]  [phone]      PATIENT NAME: Benita Alston  : 1970  DATE: 22  MRN: [id number]      Billing Provider: Adali Otero MD  Level of Service:   Patient PCP Information     Provider PCP Tanya Snyder MD General          Reason for Visit / Chief Complaint: Follow-up (C/o stiffness in neck and shoulders)           Assessment and Plan (including Health Maintenance)      Problem List  Smart Sets  Document Outside HM   :    Plan:     Benita Alston is a 46-year-old female with significant cervical neck stiffness.  During our encounter today she had barely moved her neck.  She also has had C6-C7 ACDF.  Cervical radicular symptoms on the left side are somewhat amenable to gabapentin use.  She is on low-dose 300 mg q.h.s..  Have asked her to continue this regimen until she establishes with either Dr. Grace or Dr. Mayers with pain clinic.  I want to investigate for an HLA-b27 driven spondyloarthritis. Will start with angled up SI joint radiographs.   If negative, could consider non-radiographic SpA as dx and consider cimzia since she is responsive to mobic.   APRs pending.     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 Patient needs biologic.   Recommended to get Hep B , shingrix and flu shot prior to humira  Screening labs are normal.     1. High risk medication use  Hepatitis B (Recombinant) Adjuvanted, 2 dose    Hepatitis B (Recombinant) Adjuvanted, 2 dose   2. Immunosuppression  Hepatitis B (Recombinant) Adjuvanted, 2 dose    Hepatitis B (Recombinant) Adjuvanted, 2 dose   3. HLA B27 (HLA B27 positive)  adalimumab (HUMIRA,CF,) 40 mg/0.4 mL SyKt   4. Ankylosing  spondylitis, unspecified site of spine  adalimumab (HUMIRA,CF,) 40 mg/0.4 mL SyKt             History of Present Illness / Problem Focused Workflow     Benita Alston presents to the clinic with Follow-up (C/o stiffness in neck and shoulders)     Worsening back and neck pain for 10 years. Experiences a lot of nerve pain on the left side sometimes radiating from the neck. Has not undergone any neck or epidural injections. Takes gabapentin 300mg QHS. Has been on this dose for a month. Has helped. Prescribed by Chely Morse at [place].   Has also tried mobic which has helped.   2018 underwent c6-7 ACDF in [place]. This did relieve her left sided radicular symptoms greatly.    strength is good.   No weakness.      Review of Systems     Review of Systems   Constitutional: Negative for fever.   Eyes: Negative for redness.   Respiratory: Negative for cough and shortness of breath.    Cardiovascular: Negative for chest pain.   Gastrointestinal: Positive for constipation. Negative for diarrhea.   Genitourinary: Negative for dysuria.   Skin: Negative for rash.   Neurological: Negative for headaches.   Endo/Heme/Allergies: Does not bruise/bleed easily.     Review of Systems   Constitutional: Negative for fever.   Eyes: Negative for redness.   Respiratory: Negative for cough and shortness of breath.    Cardiovascular: Negative for chest pain.   Gastrointestinal: Positive for constipation. Negative for diarrhea.   Genitourinary: Negative for dysuria.   Skin: Negative for rash.   Neurological: Negative for headaches.   Hematological: Does not bruise/bleed easily.       Medical / Social / Family History   History reviewed. No pertinent past medical history.    Past Surgical History:   Procedure Laterality Date    HYSTERECTOMY      SPINE SURGERY         Social History  Ms. Benita Alston  reports that she has never smoked. She has never used smokeless tobacco. She reports that she does not drink alcohol and does not use  drugs.    Family History  Ms.'s Joy Marlinton family history includes Breast cancer in her mother; Diabetes Mellitus in her father; Hypertension in her mother.    Medications and Allergies     Medications  Outpatient Medications Marked as Taking for the 7/20/22 encounter (Office Visit) with Adlai Otero MD   Medication Sig Dispense Refill    gabapentin (NEURONTIN) 300 MG capsule Take 300 mg by mouth nightly.      meloxicam (MOBIC) 15 MG tablet Take 15 mg by mouth once daily.      sertraline (ZOLOFT) 50 MG tablet Take 50 mg by mouth once daily.         Allergies  Review of patient's allergies indicates:  No Known Allergies    Physical Examination     Vitals:    07/20/22 1454   BP: 126/74   Pulse: 79   Resp: 16     Physical Exam  Musculoskeletal:      Comments: Cervical ROM is limited.                 Signature:  Adali Otero MD  [facility] - RHEUMATOLOGY  [facility] [address] [place] [address]  [phone]    Date of encounter: 7/20/22

## 2022-07-21 DIAGNOSIS — Z15.89 HLA B27 (HLA B27 POSITIVE): ICD-10-CM

## 2022-07-21 DIAGNOSIS — M45.9 ANKYLOSING SPONDYLITIS, UNSPECIFIED SITE OF SPINE: ICD-10-CM

## 2022-07-21 RX ORDER — ADALIMUMAB 40MG/0.4ML
40 KIT SUBCUTANEOUS
Qty: 2 EACH | Refills: 2 | Status: SHIPPED | OUTPATIENT
Start: 2022-07-21 | End: 2022-08-04

## 2022-07-21 RX ORDER — METHYLPREDNISOLONE ACETATE 40 MG/ML
40 INJECTION, SUSPENSION INTRA-ARTICULAR; INTRALESIONAL; INTRAMUSCULAR; SOFT TISSUE
Status: COMPLETED | OUTPATIENT
Start: 2022-07-20 | End: 2022-07-21

## 2022-07-21 RX ADMIN — METHYLPREDNISOLONE ACETATE 40 MG: 40 INJECTION, SUSPENSION INTRA-ARTICULAR; INTRALESIONAL; INTRAMUSCULAR; SOFT TISSUE at 11:07

## 2022-08-04 DIAGNOSIS — M45.9 ANKYLOSING SPONDYLITIS, UNSPECIFIED SITE OF SPINE: Primary | ICD-10-CM

## 2022-08-04 DIAGNOSIS — Z15.89 HLA B27 (HLA B27 POSITIVE): ICD-10-CM

## 2022-08-04 RX ORDER — ADALIMUMAB 40MG/0.8ML
40 KIT SUBCUTANEOUS
Qty: 2 EACH | Refills: 2 | Status: SHIPPED | OUTPATIENT
Start: 2022-08-04 | End: 2023-01-25

## 2023-01-21 ENCOUNTER — OFFICE VISIT (OUTPATIENT)
Dept: FAMILY MEDICINE | Facility: CLINIC | Age: 53
End: 2023-01-21
Payer: COMMERCIAL

## 2023-01-21 VITALS
HEART RATE: 87 BPM | BODY MASS INDEX: 28.58 KG/M2 | RESPIRATION RATE: 16 BRPM | OXYGEN SATURATION: 98 % | WEIGHT: 193 LBS | TEMPERATURE: 100 F | SYSTOLIC BLOOD PRESSURE: 125 MMHG | DIASTOLIC BLOOD PRESSURE: 83 MMHG | HEIGHT: 69 IN

## 2023-01-21 DIAGNOSIS — R50.9 FEVER, UNSPECIFIED FEVER CAUSE: ICD-10-CM

## 2023-01-21 DIAGNOSIS — R05.9 COUGH, UNSPECIFIED TYPE: ICD-10-CM

## 2023-01-21 DIAGNOSIS — R52 GENERALIZED BODY ACHES: ICD-10-CM

## 2023-01-21 DIAGNOSIS — J01.90 ACUTE NON-RECURRENT SINUSITIS, UNSPECIFIED LOCATION: Primary | ICD-10-CM

## 2023-01-21 PROCEDURE — 1159F MED LIST DOCD IN RCRD: CPT | Mod: ,,, | Performed by: NURSE PRACTITIONER

## 2023-01-21 PROCEDURE — 3079F PR MOST RECENT DIASTOLIC BLOOD PRESSURE 80-89 MM HG: ICD-10-PCS | Mod: ,,, | Performed by: NURSE PRACTITIONER

## 2023-01-21 PROCEDURE — 96372 PR INJECTION,THERAP/PROPH/DIAG2ST, IM OR SUBCUT: ICD-10-PCS | Mod: ,,, | Performed by: NURSE PRACTITIONER

## 2023-01-21 PROCEDURE — 3008F PR BODY MASS INDEX (BMI) DOCUMENTED: ICD-10-PCS | Mod: ,,, | Performed by: NURSE PRACTITIONER

## 2023-01-21 PROCEDURE — 3074F PR MOST RECENT SYSTOLIC BLOOD PRESSURE < 130 MM HG: ICD-10-PCS | Mod: ,,, | Performed by: NURSE PRACTITIONER

## 2023-01-21 PROCEDURE — 99213 PR OFFICE/OUTPT VISIT, EST, LEVL III, 20-29 MIN: ICD-10-PCS | Mod: 25,,, | Performed by: NURSE PRACTITIONER

## 2023-01-21 PROCEDURE — 99051 PR MEDICAL SERVICES, EVE/WKEND/HOLIDAY: ICD-10-PCS | Mod: ,,, | Performed by: NURSE PRACTITIONER

## 2023-01-21 PROCEDURE — 3074F SYST BP LT 130 MM HG: CPT | Mod: ,,, | Performed by: NURSE PRACTITIONER

## 2023-01-21 PROCEDURE — 3008F BODY MASS INDEX DOCD: CPT | Mod: ,,, | Performed by: NURSE PRACTITIONER

## 2023-01-21 PROCEDURE — 96372 THER/PROPH/DIAG INJ SC/IM: CPT | Mod: ,,, | Performed by: NURSE PRACTITIONER

## 2023-01-21 PROCEDURE — 99051 MED SERV EVE/WKEND/HOLIDAY: CPT | Mod: ,,, | Performed by: NURSE PRACTITIONER

## 2023-01-21 PROCEDURE — 87428 POCT SARS-COV2 (COVID) WITH FLU ANTIGEN: ICD-10-PCS | Mod: QW,,, | Performed by: NURSE PRACTITIONER

## 2023-01-21 PROCEDURE — 1159F PR MEDICATION LIST DOCUMENTED IN MEDICAL RECORD: ICD-10-PCS | Mod: ,,, | Performed by: NURSE PRACTITIONER

## 2023-01-21 PROCEDURE — 87428 SARSCOV & INF VIR A&B AG IA: CPT | Mod: QW,,, | Performed by: NURSE PRACTITIONER

## 2023-01-21 PROCEDURE — 99213 OFFICE O/P EST LOW 20 MIN: CPT | Mod: 25,,, | Performed by: NURSE PRACTITIONER

## 2023-01-21 PROCEDURE — 3079F DIAST BP 80-89 MM HG: CPT | Mod: ,,, | Performed by: NURSE PRACTITIONER

## 2023-01-21 RX ORDER — PROMETHAZINE HYDROCHLORIDE AND DEXTROMETHORPHAN HYDROBROMIDE 6.25; 15 MG/5ML; MG/5ML
5 SYRUP ORAL EVERY 6 HOURS PRN
Qty: 240 ML | Refills: 0 | Status: SHIPPED | OUTPATIENT
Start: 2023-01-21 | End: 2023-01-31

## 2023-01-21 RX ORDER — DEXAMETHASONE SODIUM PHOSPHATE 4 MG/ML
8 INJECTION, SOLUTION INTRA-ARTICULAR; INTRALESIONAL; INTRAMUSCULAR; INTRAVENOUS; SOFT TISSUE ONCE
Status: COMPLETED | OUTPATIENT
Start: 2023-01-21 | End: 2023-01-21

## 2023-01-21 RX ORDER — CEFTRIAXONE 1 G/1
1 INJECTION, POWDER, FOR SOLUTION INTRAMUSCULAR; INTRAVENOUS
Status: COMPLETED | OUTPATIENT
Start: 2023-01-21 | End: 2023-01-21

## 2023-01-21 RX ORDER — AMOXICILLIN AND CLAVULANATE POTASSIUM 875; 125 MG/1; MG/1
1 TABLET, FILM COATED ORAL 2 TIMES DAILY
Qty: 20 TABLET | Refills: 0 | Status: SHIPPED | OUTPATIENT
Start: 2023-01-21 | End: 2023-01-31

## 2023-01-21 RX ADMIN — DEXAMETHASONE SODIUM PHOSPHATE 8 MG: 4 INJECTION, SOLUTION INTRA-ARTICULAR; INTRALESIONAL; INTRAMUSCULAR; INTRAVENOUS; SOFT TISSUE at 07:01

## 2023-01-21 RX ADMIN — CEFTRIAXONE 1 G: 1 INJECTION, POWDER, FOR SOLUTION INTRAMUSCULAR; INTRAVENOUS at 07:01

## 2023-01-22 NOTE — PROGRESS NOTES
Subjective:       Patient ID: Benita Alston is a 52 y.o. female.    Chief Complaint: Fever, Generalized Body Aches, Cough, Headache, and Fatigue    Fever, Generalized Body Aches, Cough, Headache, and Fatigue      Fever   Associated symptoms include congestion, coughing and headaches. Pertinent negatives include no abdominal pain, chest pain, ear pain, nausea, rash, sore throat or vomiting.   Cough  Associated symptoms include a fever and headaches. Pertinent negatives include no chest pain, ear pain, rash, sore throat or shortness of breath.   Headache   Associated symptoms include coughing and a fever. Pertinent negatives include no abdominal pain, back pain, dizziness, ear pain, eye pain, nausea, neck pain, sore throat, vomiting or weakness.   Fatigue  Associated symptoms include congestion, coughing, fatigue, a fever and headaches. Pertinent negatives include no abdominal pain, change in bowel habit, chest pain, nausea, neck pain, rash, sore throat, vomiting or weakness.   Review of Systems   Constitutional:  Positive for fatigue and fever. Negative for appetite change.   HENT:  Positive for nasal congestion. Negative for ear pain and sore throat.    Eyes:  Negative for pain, discharge and itching.   Respiratory:  Positive for cough. Negative for shortness of breath.    Cardiovascular:  Negative for chest pain and leg swelling.   Gastrointestinal:  Negative for abdominal pain, change in bowel habit, nausea, vomiting and change in bowel habit.   Musculoskeletal:  Negative for back pain, gait problem and neck pain.        Body aches   Integumentary:  Negative for rash and wound.   Allergic/Immunologic: Negative for immunocompromised state.   Neurological:  Positive for headaches. Negative for dizziness and weakness.   All other systems reviewed and are negative.      Objective:      Physical Exam  Vitals and nursing note reviewed.   Constitutional:       General: She is not in acute distress.     Appearance: Normal  appearance. She is not ill-appearing, toxic-appearing or diaphoretic.   HENT:      Head: Normocephalic.      Right Ear: Tympanic membrane, ear canal and external ear normal.      Left Ear: Tympanic membrane, ear canal and external ear normal.      Nose: Mucosal edema and congestion present. No rhinorrhea.      Right Turbinates: Swollen.      Left Turbinates: Swollen.      Right Sinus: Maxillary sinus tenderness and frontal sinus tenderness present.      Left Sinus: Maxillary sinus tenderness and frontal sinus tenderness present.      Mouth/Throat:      Mouth: Mucous membranes are moist.      Pharynx: No oropharyngeal exudate or posterior oropharyngeal erythema.   Eyes:      General: No scleral icterus.        Right eye: No discharge.         Left eye: No discharge.      Extraocular Movements: Extraocular movements intact.      Conjunctiva/sclera: Conjunctivae normal.      Pupils: Pupils are equal, round, and reactive to light.   Cardiovascular:      Rate and Rhythm: Normal rate and regular rhythm.      Pulses: Normal pulses.      Heart sounds: Normal heart sounds. No murmur heard.  Pulmonary:      Effort: Pulmonary effort is normal. No respiratory distress.      Breath sounds: Normal breath sounds. No wheezing, rhonchi or rales.   Musculoskeletal:         General: Normal range of motion.      Cervical back: Neck supple. No tenderness.   Lymphadenopathy:      Cervical: No cervical adenopathy.   Skin:     General: Skin is warm and dry.      Capillary Refill: Capillary refill takes less than 2 seconds.      Findings: No rash.   Neurological:      Mental Status: She is alert and oriented to person, place, and time.   Psychiatric:         Mood and Affect: Mood normal.         Behavior: Behavior normal.         Thought Content: Thought content normal.         Judgment: Judgment normal.          Assessment:       1. Acute non-recurrent sinusitis, unspecified location    2. Fever, unspecified fever cause    3. Generalized  body aches    4. Cough, unspecified type        Plan:   Acute non-recurrent sinusitis, unspecified location  -     cefTRIAXone injection 1 g  -     dexAMETHasone injection 8 mg  -     amoxicillin-clavulanate 875-125mg (AUGMENTIN) 875-125 mg per tablet; Take 1 tablet by mouth 2 (two) times daily. for 20 doses  Dispense: 20 tablet; Refill: 0    Fever, unspecified fever cause  -     POCT SARS-COV2 (COVID) with Flu Antigen    Generalized body aches  -     POCT SARS-COV2 (COVID) with Flu Antigen    Cough, unspecified type  -     POCT SARS-COV2 (COVID) with Flu Antigen  -     promethazine-dextromethorphan (PROMETHAZINE-DM) 6.25-15 mg/5 mL Syrp; Take 5 mLs by mouth every 6 (six) hours as needed (cough).  Dispense: 240 mL; Refill: 0         Risks, benefits, and side effects were discussed with the patient. All questions were answered to the fullest satisfaction of the patient, and pt verbalized understanding and agreement to treatment plan. Pt was to call with any new or worsening symptoms, or present to the ER

## 2023-02-09 ENCOUNTER — OFFICE VISIT (OUTPATIENT)
Dept: RHEUMATOLOGY | Facility: CLINIC | Age: 53
End: 2023-02-09
Payer: COMMERCIAL

## 2023-02-09 DIAGNOSIS — M45.9 ANKYLOSING SPONDYLITIS, UNSPECIFIED SITE OF SPINE: Primary | ICD-10-CM

## 2023-02-09 DIAGNOSIS — Z15.89 HLA B27 (HLA B27 POSITIVE): ICD-10-CM

## 2023-02-09 DIAGNOSIS — Z15.89 HLA B27 (HLA B27 POSITIVE): Primary | ICD-10-CM

## 2023-02-09 PROCEDURE — 99499 NO LOS: ICD-10-PCS | Mod: 95,,, | Performed by: INTERNAL MEDICINE

## 2023-02-09 PROCEDURE — 1160F PR REVIEW ALL MEDS BY PRESCRIBER/CLIN PHARMACIST DOCUMENTED: ICD-10-PCS | Mod: 95,,, | Performed by: INTERNAL MEDICINE

## 2023-02-09 PROCEDURE — 1159F PR MEDICATION LIST DOCUMENTED IN MEDICAL RECORD: ICD-10-PCS | Mod: 95,,, | Performed by: INTERNAL MEDICINE

## 2023-02-09 PROCEDURE — 99499 UNLISTED E&M SERVICE: CPT | Mod: 95,,, | Performed by: INTERNAL MEDICINE

## 2023-02-09 PROCEDURE — 1159F MED LIST DOCD IN RCRD: CPT | Mod: 95,,, | Performed by: INTERNAL MEDICINE

## 2023-02-09 PROCEDURE — 99215 PR OFFICE/OUTPT VISIT, EST, LEVL V, 40-54 MIN: ICD-10-PCS | Mod: 95,,, | Performed by: INTERNAL MEDICINE

## 2023-02-09 PROCEDURE — 99215 OFFICE O/P EST HI 40 MIN: CPT | Mod: 95,,, | Performed by: INTERNAL MEDICINE

## 2023-02-09 PROCEDURE — 1160F RVW MEDS BY RX/DR IN RCRD: CPT | Mod: 95,,, | Performed by: INTERNAL MEDICINE

## 2023-02-09 NOTE — PROGRESS NOTES
Adali Otero MD   [facility] - RHEUMATOLOGY  [address]  [place] [address]  [phone]      PATIENT NAME: Benita Alston  : 1970  DATE: 23  MRN: [id number]      Billing Provider: Adali Otero MD  Level of Service:   Patient PCP Information       Provider PCP Tanya Snyder MD General            Reason for Visit / Chief Complaint: No chief complaint on file.           Assessment and Plan (including Health Maintenance)      Problem List  Smart Sets  Document Outside HM   :    Plan:     Benita Alston is a 46-year-old female with significant cervical neck stiffness.  During our encounter today she had barely moved her neck.  She also has had C6-C7 ACDF.  Cervical radicular symptoms on the left side are somewhat amenable to gabapentin use.  She is on low-dose 300 mg q.h.s..  Have asked her to continue this regimen until she establishes with either Dr. Grace or Dr. Mayers with pain clinic.  I want to investigate for an HLA-b27 driven spondyloarthritis. Will start with angled up SI joint radiographs.   If negative, could consider non-radiographic SpA as dx and consider cimzia since she is responsive to mobic.   APRs pending.     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 Patient needs biologic.   Recommended to get Hep B , shingrix and flu shot prior to humira  Screening labs are normal.     2023;  Has been on Humira for over 6months and it is not helping.       ICD-10-CM ICD-9-CM    1. Ankylosing spondylitis, unspecified site of spine  M45.9 720.0       2. HLA B27 (HLA B27 positive)  Z15.89 V84.89              1. Ankylosing spondylitis, unspecified site of spine        2. HLA B27 (HLA B27 positive)                    History of Present  Illness / Problem Focused Workflow     Benita Alston presents to the clinic with No chief complaint on file.     Worsening back and neck pain for 10 years. Experiences a lot of nerve pain on the left side sometimes radiating from the neck. Has not undergone any neck or epidural injections. Takes gabapentin 300mg QHS. Has been on this dose for a month. Has helped. Prescribed by Chely Morse at [place].   Has also tried mobic which has helped.   2018 underwent c6-7 ACDF in [place]. This did relieve her left sided radicular symptoms greatly.    strength is good.   No weakness.      Review of Systems     Review of Systems   Constitutional:  Negative for fever.   Eyes:  Negative for redness.   Respiratory:  Negative for cough and shortness of breath.    Cardiovascular:  Negative for chest pain.   Gastrointestinal:  Positive for constipation. Negative for diarrhea.   Genitourinary:  Negative for dysuria.   Skin:  Negative for rash.   Neurological:  Negative for headaches.   Endo/Heme/Allergies:  Does not bruise/bleed easily.   Review of Systems   Constitutional:  Negative for fever.   Eyes:  Negative for redness.   Respiratory:  Negative for cough and shortness of breath.    Cardiovascular:  Negative for chest pain.   Gastrointestinal:  Positive for constipation. Negative for diarrhea.   Genitourinary:  Negative for dysuria.   Skin:  Negative for rash.   Neurological:  Negative for headaches.   Hematological:  Does not bruise/bleed easily.     Medical / Social / Family History   No past medical history on file.    Past Surgical History:   Procedure Laterality Date    HYSTERECTOMY      SPINE SURGERY         Social History  Ms. Benita Alston  reports that she has never smoked. She has never used smokeless tobacco. She reports that she does not drink alcohol and does not use drugs.    Family History  Ms.'le Alston family history includes Breast cancer in her mother; Diabetes Mellitus in her father; Hypertension in her  mother.    Medications and Allergies     Medications  No outpatient medications have been marked as taking for the 2/9/23 encounter (Office Visit) with Adali Otero MD.       Allergies  Review of patient's allergies indicates:  No Known Allergies    Physical Examination     There were no vitals filed for this visit.    Physical Exam  Musculoskeletal:      Comments: Cervical ROM is limited.               Signature:  Adali Otero MD  [facility] RHEUMATOLOGY  [address] [place] [id number]  [phone]    Date of encounter: 2/9/23

## 2023-02-09 NOTE — PROGRESS NOTES
Adali Otero MD   [facility] - RHEUMATOLOGY  [address]  [place] [address]  [phone]      PATIENT NAME: Benita Alston  : 1970  DATE: 23  MRN: [id number]      Billing Provider: Adali Otero MD  Level of Service:   Patient PCP Information       Provider PCP Tanya Snyder MD General            Reason for Visit / Chief Complaint: No chief complaint on file.           Assessment and Plan (including Health Maintenance)      Problem List  Smart Sets  Document Outside HM   :    Plan:     Benita Alston is a 46-year-old female with significant cervical neck stiffness.  During our encounter today she had barely moved her neck.  She also has had C6-C7 ACDF.  Cervical radicular symptoms on the left side are somewhat amenable to gabapentin use.  She is on low-dose 300 mg q.h.s..  Have asked her to continue this regimen until she establishes with either Dr. Grace or Dr. Mayers with pain clinic.  I want to investigate for an HLA-b27 driven spondyloarthritis. Will start with angled up SI joint radiographs.   If negative, could consider non-radiographic SpA as dx and consider cimzia since she is responsive to mobic.   APRs pending.     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 Patient needs biologic.   Recommended to get Hep B , shingrix and flu shot prior to humira  Screening labs are normal.     No diagnosis found.            History of Present Illness / Problem Focused Workflow     Benita Alston presents to the clinic with No chief complaint on file.     Worsening back and neck pain for 10 years. Experiences a lot of nerve pain on the left side sometimes radiating from the neck. Has not undergone any neck or epidural injections. Takes gabapentin  300mg QHS. Has been on this dose for a month. Has helped. Prescribed by Chely Morse at [facility].   Has also tried mobic which has helped.   2018 underwent c6-7 ACDF in [place]. This did relieve her left sided radicular symptoms greatly.    strength is good.   No weakness.      Review of Systems     Review of Systems   Constitutional:  Negative for fever.   Eyes:  Negative for redness.   Respiratory:  Negative for cough and shortness of breath.    Cardiovascular:  Negative for chest pain.   Gastrointestinal:  Positive for constipation. Negative for diarrhea.   Genitourinary:  Negative for dysuria.   Skin:  Negative for rash.   Neurological:  Negative for headaches.   Endo/Heme/Allergies:  Does not bruise/bleed easily.     Review of Systems   Constitutional:  Negative for fever.   Eyes:  Negative for redness.   Respiratory:  Negative for cough and shortness of breath.    Cardiovascular:  Negative for chest pain.   Gastrointestinal:  Positive for constipation. Negative for diarrhea.   Genitourinary:  Negative for dysuria.   Skin:  Negative for rash.   Neurological:  Negative for headaches.   Hematological:  Does not bruise/bleed easily.       Medical / Social / Family History   No past medical history on file.    Past Surgical History:   Procedure Laterality Date    HYSTERECTOMY      SPINE SURGERY         Social History  Ms. Benita Alston  reports that she has never smoked. She has never used smokeless tobacco. She reports that she does not drink alcohol and does not use drugs.    Family History  Ms.'s Benita Alston family history includes Breast cancer in her mother; Diabetes Mellitus in her father; Hypertension in her mother.    Medications and Allergies     Medications  No outpatient medications have been marked as taking for the 2/9/23 encounter (Appointment) with Adali Otero MD.       Allergies  Review of patient's allergies indicates:  No Known Allergies    Physical Examination     There were no vitals  filed for this visit.    Physical Exam  Musculoskeletal:      Comments: Cervical ROM is limited.               Signature:  Adali Otero MD  [facility] RHEUMATOLOGY  [address] [place] [id number]  [phone]    Date of encounter: 2/9/23

## 2023-03-01 ENCOUNTER — SPECIALTY PHARMACY (OUTPATIENT)
Dept: PHARMACY | Facility: CLINIC | Age: 53
End: 2023-03-01

## 2023-03-01 DIAGNOSIS — M45.9 ANKYLOSING SPONDYLITIS, UNSPECIFIED SITE OF SPINE: Primary | ICD-10-CM

## 2023-03-01 RX ORDER — IXEKIZUMAB 80 MG/ML
80 INJECTION, SOLUTION SUBCUTANEOUS WEEKLY
Qty: 2 EACH | Refills: 2 | OUTPATIENT
Start: 2023-03-01 | End: 2023-03-06 | Stop reason: SDUPTHER

## 2023-03-01 RX ORDER — IXEKIZUMAB 80 MG/ML
160 INJECTION, SOLUTION SUBCUTANEOUS ONCE
Qty: 1 EACH | Refills: 0 | Status: SHIPPED | OUTPATIENT
Start: 2023-03-01 | End: 2023-03-01

## 2023-03-01 NOTE — TELEPHONE ENCOUNTER
Renzo MITCHELL is required.  PA submitted for loading dose via [facility]. Key: [id number]   Messaged MDO re: maintenance dose. See vanessa.

## 2023-03-02 NOTE — TELEPHONE ENCOUNTER
Mark, this is Sharmila Collazo with [facility].  We are working on your prescription that your doctor has sent us. We will be working with your insurance to get this approved for you. We will be calling you along the way with updates on your medication.  If you have any questions, you can reach us at [phone].    Welcome call outcome: Left voicemail.

## 2023-03-02 NOTE — TELEPHONE ENCOUNTER
Loading dose PA approved from 1/30/2023 to 03/31/2023  Case ID: [id number]   Maintenance dose PA approved from 1/30/2023 to 08/28/2023  Case ID: [id number]      Patient is locked into [facility].   See ivent- need clarification from MD before able to route out.

## 2023-03-06 DIAGNOSIS — M45.9 ANKYLOSING SPONDYLITIS, UNSPECIFIED SITE OF SPINE: ICD-10-CM

## 2023-03-06 RX ORDER — IXEKIZUMAB 80 MG/ML
80 INJECTION, SOLUTION SUBCUTANEOUS
Qty: 3 EACH | Refills: 2 | Status: SHIPPED | OUTPATIENT
Start: 2023-03-06 | End: 2023-04-03

## 2023-03-06 NOTE — TELEPHONE ENCOUNTER
Updated Rx received from MDO.   Patient locked into [facility].   Routing out RX and closing referral. Thank you for allowing us to participate in this patient's care.

## 2023-03-16 NOTE — TELEPHONE ENCOUNTER
Incoming call- pt asking about status of Taltz. Advised her PA is approved. She is required to fill at [facility].  Pt has [facility] ph #. I also told her about signing up for Taltz copay support.   Pt will call [facility] if any issues at [facility].

## 2023-04-18 ENCOUNTER — HOSPITAL ENCOUNTER (OUTPATIENT)
Dept: RADIOLOGY | Facility: HOSPITAL | Age: 53
Discharge: HOME | End: 2023-04-18
Payer: COMMERCIAL

## 2023-04-18 VITALS — HEIGHT: 69 IN | WEIGHT: 195 LBS | BODY MASS INDEX: 28.88 KG/M2

## 2023-04-18 DIAGNOSIS — Z12.31 BREAST CANCER SCREENING BY MAMMOGRAM: ICD-10-CM

## 2023-04-18 PROCEDURE — 77067 SCR MAMMO BI INCL CAD: CPT | Mod: TC

## 2023-04-20 ENCOUNTER — HOSPITAL ENCOUNTER (OUTPATIENT)
Dept: RADIOLOGY | Facility: HOSPITAL | Age: 53
Discharge: HOME | End: 2023-04-20
Attending: RADIOLOGY
Payer: COMMERCIAL

## 2023-04-20 DIAGNOSIS — R92.8 ABNORMAL FINDING ON RADIOLOGICAL EXAMINATION OF BREAST: ICD-10-CM

## 2023-04-20 PROCEDURE — 77065 DX MAMMO INCL CAD UNI: CPT | Mod: TC,LT

## 2023-04-24 PROBLEM — J01.90 ACUTE NON-RECURRENT SINUSITIS: Status: RESOLVED | Noted: 2023-01-21 | Resolved: 2023-04-24

## 2023-07-05 ENCOUNTER — OFFICE VISIT (OUTPATIENT)
Dept: RHEUMATOLOGY | Facility: CLINIC | Age: 53
End: 2023-07-05
Payer: COMMERCIAL

## 2023-07-05 VITALS
SYSTOLIC BLOOD PRESSURE: 114 MMHG | OXYGEN SATURATION: 99 % | WEIGHT: 195 LBS | DIASTOLIC BLOOD PRESSURE: 76 MMHG | BODY MASS INDEX: 28.88 KG/M2 | RESPIRATION RATE: 18 BRPM | HEART RATE: 76 BPM | HEIGHT: 69 IN | TEMPERATURE: 97 F

## 2023-07-05 DIAGNOSIS — M54.9 DORSALGIA, UNSPECIFIED: ICD-10-CM

## 2023-07-05 DIAGNOSIS — Z79.899 HIGH RISK MEDICATION USE: Primary | ICD-10-CM

## 2023-07-05 PROCEDURE — 3008F PR BODY MASS INDEX (BMI) DOCUMENTED: ICD-10-PCS | Mod: ,,, | Performed by: INTERNAL MEDICINE

## 2023-07-05 PROCEDURE — 3074F PR MOST RECENT SYSTOLIC BLOOD PRESSURE < 130 MM HG: ICD-10-PCS | Mod: ,,, | Performed by: INTERNAL MEDICINE

## 2023-07-05 PROCEDURE — 3074F SYST BP LT 130 MM HG: CPT | Mod: ,,, | Performed by: INTERNAL MEDICINE

## 2023-07-05 PROCEDURE — 99215 OFFICE O/P EST HI 40 MIN: CPT | Mod: S$PBB,,, | Performed by: INTERNAL MEDICINE

## 2023-07-05 PROCEDURE — 1159F PR MEDICATION LIST DOCUMENTED IN MEDICAL RECORD: ICD-10-PCS | Mod: ,,, | Performed by: INTERNAL MEDICINE

## 2023-07-05 PROCEDURE — 3008F BODY MASS INDEX DOCD: CPT | Mod: ,,, | Performed by: INTERNAL MEDICINE

## 2023-07-05 PROCEDURE — 3078F DIAST BP <80 MM HG: CPT | Mod: ,,, | Performed by: INTERNAL MEDICINE

## 2023-07-05 PROCEDURE — 99215 PR OFFICE/OUTPT VISIT, EST, LEVL V, 40-54 MIN: ICD-10-PCS | Mod: S$PBB,,, | Performed by: INTERNAL MEDICINE

## 2023-07-05 PROCEDURE — 99214 OFFICE O/P EST MOD 30 MIN: CPT | Mod: PBBFAC | Performed by: INTERNAL MEDICINE

## 2023-07-05 PROCEDURE — 1159F MED LIST DOCD IN RCRD: CPT | Mod: ,,, | Performed by: INTERNAL MEDICINE

## 2023-07-05 PROCEDURE — 3078F PR MOST RECENT DIASTOLIC BLOOD PRESSURE < 80 MM HG: ICD-10-PCS | Mod: ,,, | Performed by: INTERNAL MEDICINE

## 2023-07-05 RX ORDER — DOCUSATE SODIUM 100 MG/1
100 CAPSULE, LIQUID FILLED ORAL 2 TIMES DAILY
Qty: 60 CAPSULE | Refills: 0 | Status: SHIPPED | OUTPATIENT
Start: 2023-07-05

## 2023-07-05 RX ORDER — PANTOPRAZOLE SODIUM 40 MG/1
40 TABLET, DELAYED RELEASE ORAL
COMMUNITY
Start: 2023-05-30

## 2023-07-05 RX ORDER — CELECOXIB 100 MG/1
100 CAPSULE ORAL 2 TIMES DAILY
Qty: 60 CAPSULE | Refills: 1 | Status: SHIPPED | OUTPATIENT
Start: 2023-07-05 | End: 2023-09-11 | Stop reason: SDUPTHER

## 2023-07-05 RX ORDER — CLORAZEPATE DIPOTASSIUM 3.75 MG/1
TABLET ORAL
COMMUNITY
Start: 2023-05-30

## 2023-07-05 NOTE — PROGRESS NOTES
Adali Otero MD   [facility] - RHEUMATOLOGY  [address] [facility]  [place] [address]  [phone]      PATIENT NAME: Benita Alston  : 1970  DATE: 23  MRN: [id number]      Billing Provider: Adali Otero MD  Level of Service:   Patient PCP Information       Provider PCP Tanya Snyder MD General            Reason for Visit / Chief Complaint: No chief complaint on file.           Assessment and Plan (including Health Maintenance)      Problem List  Smart Sets  Document Outside HM   :    Plan:     Benita Alston is a 46-year-old female with significant cervical neck stiffness.  During our encounter today she had barely moved her neck.  She also has had C6-C7 ACDF.  Cervical radicular symptoms on the left side are somewhat amenable to gabapentin use.  She is on low-dose 300 mg q.h.s..  Have asked her to continue this regimen until she establishes with either Dr. Grace or Dr. Mayers with pain clinic.  I want to investigate for an HLA-b27 driven spondyloarthritis. Will start with angled up SI joint radiographs.   If negative, could consider non-radiographic SpA as dx and consider cimzia since she is responsive to mobic.   APRs pending.     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 Patient needs biologic.   Recommended to get Hep B , shingrix and flu shot prior to humira  Screening labs are normal.     2023;  Has been on Humira for over 6months and it is not helping.     2023;   Started Taltz about 2 months ago.   Neck pain is better than it was. Is not as stiff as she used to be.   FUP with Edda santos to discuss MRI of spine. Refer to Dr. Grace [pain] if + for significant spondylosis.       ICD-10-CM ICD-9-CM    1. High risk medication  use  Z79.899 V58.69 docusate sodium (COLACE) 100 MG capsule      celecoxib (CELEBREX) 100 MG capsule      2. Dorsalgia, unspecified  M54.9 724.5 MRI Lumbar Spine Without Contrast      Comprehensive Metabolic Panel      AST (SGOT)      ALT (SGPT)      CBC Auto Differential                 History of Present Illness / Problem Focused Workflow     Benita Alston presents to the clinic with No chief complaint on file.     Worsening back and neck pain for 10 years. Experiences a lot of nerve pain on the left side sometimes radiating from the neck. Has not undergone any neck or epidural injections. Takes gabapentin 300mg QHS. Has been on this dose for a month. Has helped. Prescribed by Chely Morse at [facility].   Has also tried mobic which has helped.   2018 underwent c6-7 ACDF in [place]. This did relieve her left sided radicular symptoms greatly.    strength is good.   No weakness.      Review of Systems     Review of Systems   Constitutional:  Negative for fever and unexpected weight change.   HENT:  Negative for mouth sores and trouble swallowing.    Eyes:  Negative for redness.   Respiratory:  Negative for cough and shortness of breath.    Cardiovascular:  Negative for chest pain.   Gastrointestinal:  Positive for constipation. Negative for diarrhea.   Genitourinary:  Negative for dysuria and genital sores.   Skin:  Negative for rash.   Neurological:  Negative for headaches.   Endo/Heme/Allergies:  Does not bruise/bleed easily.   Review of Systems   Constitutional:  Negative for fever and unexpected weight change.   HENT:  Negative for mouth sores and trouble swallowing.    Eyes:  Negative for redness.   Respiratory:  Negative for cough and shortness of breath.    Cardiovascular:  Negative for chest pain.   Gastrointestinal:  Positive for constipation. Negative for diarrhea.   Genitourinary:  Negative for dysuria and genital sores.   Skin:  Negative for rash.   Neurological:  Negative for headaches.    Hematological:  Does not bruise/bleed easily.     Medical / Social / Family History   No past medical history on file.    Past Surgical History:   Procedure Laterality Date    HYSTERECTOMY      SPINE SURGERY         Social History  Ms. Benita Alston  reports that she has never smoked. She has never used smokeless tobacco. She reports that she does not drink alcohol and does not use drugs.    Family History  Ms.'le Alston family history includes Breast cancer in her mother; Diabetes Mellitus in her father; Hypertension in her mother.    Medications and Allergies     Medications  No outpatient medications have been marked as taking for the 7/5/23 encounter (Appointment) with Adali Otero MD.       Allergies  Review of patient's allergies indicates:  No Known Allergies    Physical Examination     There were no vitals filed for this visit.    Physical Exam  Musculoskeletal:      Comments: Cervical ROM is limited.               Signature:  Adali Otero MD  [facility] - RHEUMATOLOGY  [address] [place] [address] [place] [address]  [phone]    Date of encounter: 7/5/23

## 2023-08-31 ENCOUNTER — HOSPITAL ENCOUNTER (OUTPATIENT)
Dept: RADIOLOGY | Facility: HOSPITAL | Age: 53
Discharge: HOME | End: 2023-08-31
Attending: INTERNAL MEDICINE
Payer: COMMERCIAL

## 2023-08-31 DIAGNOSIS — M54.9 DORSALGIA, UNSPECIFIED: ICD-10-CM

## 2023-08-31 PROCEDURE — 72148 MRI LUMBAR SPINE W/O DYE: CPT | Mod: 26,,, | Performed by: RADIOLOGY

## 2023-08-31 PROCEDURE — 72148 MRI LUMBAR SPINE WITHOUT CONTRAST: ICD-10-PCS | Mod: 26,,, | Performed by: RADIOLOGY

## 2023-08-31 PROCEDURE — 72148 MRI LUMBAR SPINE W/O DYE: CPT | Mod: TC

## 2023-09-08 RX ORDER — SERTRALINE HYDROCHLORIDE 100 MG/1
100 TABLET, FILM COATED ORAL
COMMUNITY
Start: 2023-08-29

## 2023-09-08 RX ORDER — DIAZEPAM 5 MG/1
TABLET ORAL
COMMUNITY
Start: 2023-08-22 | End: 2023-09-11 | Stop reason: ALTCHOICE

## 2023-09-11 ENCOUNTER — OFFICE VISIT (OUTPATIENT)
Dept: RHEUMATOLOGY | Facility: CLINIC | Age: 53
End: 2023-09-11
Payer: COMMERCIAL

## 2023-09-11 VITALS
DIASTOLIC BLOOD PRESSURE: 72 MMHG | WEIGHT: 195 LBS | HEIGHT: 69 IN | SYSTOLIC BLOOD PRESSURE: 122 MMHG | BODY MASS INDEX: 28.88 KG/M2 | HEART RATE: 65 BPM | OXYGEN SATURATION: 98 % | RESPIRATION RATE: 16 BRPM

## 2023-09-11 DIAGNOSIS — M54.89 BACK PAIN DUE TO INFLAMMATORY PROCESS: ICD-10-CM

## 2023-09-11 DIAGNOSIS — M45.9 ANKYLOSING SPONDYLITIS, UNSPECIFIED SITE OF SPINE: Primary | ICD-10-CM

## 2023-09-11 DIAGNOSIS — Z79.899 HIGH RISK MEDICATION USE: ICD-10-CM

## 2023-09-11 PROCEDURE — 3008F BODY MASS INDEX DOCD: CPT | Mod: ,,, | Performed by: NURSE PRACTITIONER

## 2023-09-11 PROCEDURE — 99999PBSHW PR PBB SHADOW TECHNICAL ONLY FILED TO HB: Mod: PBBFAC,,,

## 2023-09-11 PROCEDURE — 99999PBSHW PR PBB SHADOW TECHNICAL ONLY FILED TO HB: ICD-10-PCS | Mod: PBBFAC,,,

## 2023-09-11 PROCEDURE — 1159F MED LIST DOCD IN RCRD: CPT | Mod: ,,, | Performed by: NURSE PRACTITIONER

## 2023-09-11 PROCEDURE — 1159F PR MEDICATION LIST DOCUMENTED IN MEDICAL RECORD: ICD-10-PCS | Mod: ,,, | Performed by: NURSE PRACTITIONER

## 2023-09-11 PROCEDURE — 99215 OFFICE O/P EST HI 40 MIN: CPT | Mod: S$PBB,25,, | Performed by: NURSE PRACTITIONER

## 2023-09-11 PROCEDURE — 99215 PR OFFICE/OUTPT VISIT, EST, LEVL V, 40-54 MIN: ICD-10-PCS | Mod: S$PBB,25,, | Performed by: NURSE PRACTITIONER

## 2023-09-11 PROCEDURE — 99214 OFFICE O/P EST MOD 30 MIN: CPT | Mod: PBBFAC | Performed by: NURSE PRACTITIONER

## 2023-09-11 PROCEDURE — 3078F DIAST BP <80 MM HG: CPT | Mod: ,,, | Performed by: NURSE PRACTITIONER

## 2023-09-11 PROCEDURE — 3074F PR MOST RECENT SYSTOLIC BLOOD PRESSURE < 130 MM HG: ICD-10-PCS | Mod: ,,, | Performed by: NURSE PRACTITIONER

## 2023-09-11 PROCEDURE — 96372 THER/PROPH/DIAG INJ SC/IM: CPT | Mod: PBBFAC | Performed by: NURSE PRACTITIONER

## 2023-09-11 PROCEDURE — 3074F SYST BP LT 130 MM HG: CPT | Mod: ,,, | Performed by: NURSE PRACTITIONER

## 2023-09-11 PROCEDURE — 3078F PR MOST RECENT DIASTOLIC BLOOD PRESSURE < 80 MM HG: ICD-10-PCS | Mod: ,,, | Performed by: NURSE PRACTITIONER

## 2023-09-11 PROCEDURE — 3008F PR BODY MASS INDEX (BMI) DOCUMENTED: ICD-10-PCS | Mod: ,,, | Performed by: NURSE PRACTITIONER

## 2023-09-11 RX ORDER — CELECOXIB 100 MG/1
100 CAPSULE ORAL 2 TIMES DAILY
Qty: 60 CAPSULE | Refills: 2 | Status: SHIPPED | OUTPATIENT
Start: 2023-09-11 | End: 2023-12-10

## 2023-09-11 RX ORDER — METHYLPREDNISOLONE ACETATE 40 MG/ML
40 INJECTION, SUSPENSION INTRA-ARTICULAR; INTRALESIONAL; INTRAMUSCULAR; SOFT TISSUE
Status: COMPLETED | OUTPATIENT
Start: 2023-09-11 | End: 2023-09-11

## 2023-09-11 RX ADMIN — METHYLPREDNISOLONE ACETATE 40 MG: 40 INJECTION, SUSPENSION INTRA-ARTICULAR; INTRALESIONAL; INTRAMUSCULAR; SOFT TISSUE at 03:09

## 2023-09-11 NOTE — PROGRESS NOTES
"    RHEUMATOLOGY OUTPATIENT CLINIC NOTE    Subjective:       Patient ID: Benita Alston is a 52 y.o. female.    Chief Complaint: Follow-up (Follow up MRI)       History of Present Illness: 51 y/o female presents to the clinic today to follow up on MRI lumbar spine results. MRI notes "degenerative change at L3-4 and L4-5 with spinal canal and neuroforaminal narrowing..". States that has noticed very little difference with back, hip, and shoulder/neck pain while taking Taltz. States that she was previously taking Humira and had no response to the drug; therefore, tried to switch to Cimzia, but had to switch to formulary preferred first. Taltz started in 4/2023 and states she doesn't see much of a difference. States that back pain, hip pain, shoulder pain waxes and wanes and is not every day. States that when her pain is bad enough she will go to local clinic and get steroid injection which helps for about a month or so. Discussed PO Prednisone use. Would prefer to hold off on that for now. Can do low dose Prednisone in the future for short term intervals. States that prior to ACDF cervical surgery was having burning sensation extending down into the left arm and is now having that sensation to the right arm intermittently. Continues to take Gabapentin 300 mg qHS but states cannot tell much of a difference with burning/tingling due to it making her sleepy.     History reviewed. No pertinent past medical history.  Social History     Tobacco Use    Smoking status: Never    Smokeless tobacco: Never   Substance Use Topics    Alcohol use: Never     Review of patient's allergies indicates:  No Known Allergies    Objective:   /72 (BP Location: Left arm, Patient Position: Sitting, BP Method: Large (Manual))   Pulse 65   Resp 16   Ht 5' 9" (1.753 m)   Wt 88.5 kg (195 lb)   SpO2 98%   BMI 28.80 kg/m²     Immunization History   Administered Date(s) Administered    COVID-19, MRNA, LN-S, PF (Pfizer) (Purple Cap) " 07/27/2021, 08/19/2021       Current Outpatient Medications   Medication Instructions    celecoxib (CELEBREX) 100 mg, Oral, 2 times daily    clorazepate (TRANXENE) 3.75 MG Tab TAKE 1 TABLET BY MOUTH 2 TIMES EVERY DAY AS NEEDED    docusate sodium (COLACE) 100 mg, Oral, 2 times daily    gabapentin (NEURONTIN) 300 mg, Oral, Nightly    meloxicam (MOBIC) 15 mg, Oral, Daily    pantoprazole (PROTONIX) 40 mg, Oral    sertraline (ZOLOFT) 100 mg, Oral    TALTZ AUTOINJECTOR, 2 PACK, 80 mg/mL AtIn INJECT 160 MG UNDER THE SKIN ON DAY 1, THEN 80 MG EVERY 28 DAYS      BIOLOGIC LABS  Lab Results   Component Value Date    HEPCAB Non-Reactive 06/21/2022        RHEUM LABS  Lab Results   Component Value Date    CRP <0.29 06/21/2022     Assessment:     Review of Systems   Constitutional:  Negative for fever.   Eyes:  Negative for redness.   Respiratory:  Negative for cough and shortness of breath.    Cardiovascular:  Negative for chest pain.   Gastrointestinal:  Negative for constipation and diarrhea.   Genitourinary:  Negative for dysuria.   Skin:  Negative for rash.   Neurological:  Negative for headaches.   Endo/Heme/Allergies:  Does not bruise/bleed easily.      Physical Exam   Constitutional: No distress.   HENT:   Mouth/Throat: Mucous membranes are moist.   Neck:   ACDF cervical spine by Dr. Edward in [place].    Pulmonary/Chest: Effort normal.   Musculoskeletal:      Cervical back: Decreased range of motion.      Right lower leg: No edema.      Left lower leg: No edema.   Neurological: She is alert.   Psychiatric: Her behavior is normal. Mood normal.   Vitals reviewed.     1. Ankylosing spondylitis, unspecified site of spine    2. Back pain due to inflammatory process    3. High risk medication use      I have reviewed the following:     Details / Date    []   Labs     []   Micro     []   Pathology     [x]   Imaging MRI lumbar spine    []   Cardiology Procedures     [x]   Other Chart Review     Answers submitted by the patient  "for this visit:  Rheumatology Questionnaire (Submitted on 9/11/2023)  mouth sores: No  trouble swallowing: No  unexpected weight change: No  genital sore: No    Plan:     Continue Taltz 80 mg/mL monthly, will look into switching to Cimzia after 6 months of consecutive usage.   CBC, LFTs to be drawn today, no labs prior to visit  Refer to pain management for possible JADEN to lumbar spine  Methylprednisolone 40 mg IM x 1 dose today, patient to call back if "flare" for PO Prednisone  Continue Celebrex 200 mg PO daily     40 minutes of total time spent on the encounter, which includes face to face time and non-face to face time preparing to see the patient (eg, review of tests), Obtaining and/or reviewing separately obtained history, Documenting clinical information in the electronic or other health record, Independently interpreting results (not separately reported) and communicating results to the patient/family/caregiver, or Care coordination (not separately reported).            SHEMAR JosephP  [facility] - RHEUMATOLOGY  [address] [address]  [phone]     "

## 2023-11-09 ENCOUNTER — OFFICE VISIT (OUTPATIENT)
Dept: FAMILY MEDICINE | Facility: CLINIC | Age: 53
End: 2023-11-09
Payer: COMMERCIAL

## 2023-11-09 VITALS
OXYGEN SATURATION: 98 % | TEMPERATURE: 98 F | SYSTOLIC BLOOD PRESSURE: 120 MMHG | WEIGHT: 181.5 LBS | RESPIRATION RATE: 16 BRPM | BODY MASS INDEX: 26.88 KG/M2 | HEIGHT: 69 IN | HEART RATE: 75 BPM | DIASTOLIC BLOOD PRESSURE: 70 MMHG

## 2023-11-09 DIAGNOSIS — R05.9 COUGH, UNSPECIFIED TYPE: ICD-10-CM

## 2023-11-09 DIAGNOSIS — J06.9 ACUTE UPPER RESPIRATORY INFECTION: Primary | ICD-10-CM

## 2023-11-09 DIAGNOSIS — R50.9 FEVER, UNSPECIFIED FEVER CAUSE: ICD-10-CM

## 2023-11-09 LAB
CTP QC/QA: YES
POC MOLECULAR INFLUENZA A AGN: NEGATIVE
POC MOLECULAR INFLUENZA B AGN: NEGATIVE
S PYO RRNA THROAT QL PROBE: NEGATIVE
SARS-COV-2 RDRP RESP QL NAA+PROBE: NEGATIVE

## 2023-11-09 PROCEDURE — 87635: ICD-10-PCS | Mod: QW,,, | Performed by: NURSE PRACTITIONER

## 2023-11-09 PROCEDURE — 3074F SYST BP LT 130 MM HG: CPT | Mod: ,,, | Performed by: NURSE PRACTITIONER

## 2023-11-09 PROCEDURE — 1159F PR MEDICATION LIST DOCUMENTED IN MEDICAL RECORD: ICD-10-PCS | Mod: ,,, | Performed by: NURSE PRACTITIONER

## 2023-11-09 PROCEDURE — 1159F MED LIST DOCD IN RCRD: CPT | Mod: ,,, | Performed by: NURSE PRACTITIONER

## 2023-11-09 PROCEDURE — 87502 INFLUENZA DNA AMP PROBE: CPT | Mod: QW,,, | Performed by: NURSE PRACTITIONER

## 2023-11-09 PROCEDURE — 87635 SARS-COV-2 COVID-19 AMP PRB: CPT | Mod: QW,,, | Performed by: NURSE PRACTITIONER

## 2023-11-09 PROCEDURE — 3008F PR BODY MASS INDEX (BMI) DOCUMENTED: ICD-10-PCS | Mod: ,,, | Performed by: NURSE PRACTITIONER

## 2023-11-09 PROCEDURE — 3008F BODY MASS INDEX DOCD: CPT | Mod: ,,, | Performed by: NURSE PRACTITIONER

## 2023-11-09 PROCEDURE — 87880 STREP A ASSAY W/OPTIC: CPT | Mod: QW,,, | Performed by: NURSE PRACTITIONER

## 2023-11-09 PROCEDURE — 3074F PR MOST RECENT SYSTOLIC BLOOD PRESSURE < 130 MM HG: ICD-10-PCS | Mod: ,,, | Performed by: NURSE PRACTITIONER

## 2023-11-09 PROCEDURE — 3078F PR MOST RECENT DIASTOLIC BLOOD PRESSURE < 80 MM HG: ICD-10-PCS | Mod: ,,, | Performed by: NURSE PRACTITIONER

## 2023-11-09 PROCEDURE — 1160F PR REVIEW ALL MEDS BY PRESCRIBER/CLIN PHARMACIST DOCUMENTED: ICD-10-PCS | Mod: ,,, | Performed by: NURSE PRACTITIONER

## 2023-11-09 PROCEDURE — 87502 CHG INFECTIOUS AGENT DNA/RNA INFLUENZA FIRST 2 TYPES: ICD-10-PCS | Mod: QW,,, | Performed by: NURSE PRACTITIONER

## 2023-11-09 PROCEDURE — 99214 PR OFFICE/OUTPT VISIT, EST, LEVL IV, 30-39 MIN: ICD-10-PCS | Mod: ,,, | Performed by: NURSE PRACTITIONER

## 2023-11-09 PROCEDURE — 1160F RVW MEDS BY RX/DR IN RCRD: CPT | Mod: ,,, | Performed by: NURSE PRACTITIONER

## 2023-11-09 PROCEDURE — 99214 OFFICE O/P EST MOD 30 MIN: CPT | Mod: ,,, | Performed by: NURSE PRACTITIONER

## 2023-11-09 PROCEDURE — 3078F DIAST BP <80 MM HG: CPT | Mod: ,,, | Performed by: NURSE PRACTITIONER

## 2023-11-09 PROCEDURE — 87880 POCT RAPID STREP A: ICD-10-PCS | Mod: QW,,, | Performed by: NURSE PRACTITIONER

## 2023-11-09 RX ORDER — BROMPHENIRAMINE MALEATE, PSEUDOEPHEDRINE HYDROCHLORIDE, AND DEXTROMETHORPHAN HYDROBROMIDE 2; 30; 10 MG/5ML; MG/5ML; MG/5ML
10 SYRUP ORAL EVERY 6 HOURS PRN
Qty: 240 ML | Refills: 0 | Status: SHIPPED | OUTPATIENT
Start: 2023-11-09

## 2023-11-09 RX ORDER — CETIRIZINE HYDROCHLORIDE, PSEUDOEPHEDRINE HYDROCHLORIDE 5; 120 MG/1; MG/1
1 TABLET, FILM COATED, EXTENDED RELEASE ORAL 2 TIMES DAILY
Qty: 24 TABLET | Refills: 0 | Status: SHIPPED | OUTPATIENT
Start: 2023-11-09 | End: 2023-11-11

## 2023-11-09 NOTE — PROGRESS NOTES
"Subjective:       Benita Alston is a 53 y.o. female who presents for evaluation of symptoms of a URI. Symptoms include achiness, cough described as productive, fever 101, and post nasal drip. Onset of symptoms was 1 day ago, and has been gradually worsening since that time. Treatment to date:  ibuprofen .    Review of Systems  Pertinent items are noted in HPI.     Objective:      /70 (BP Location: Left arm, Patient Position: Sitting, BP Method: Large (Manual))   Pulse 75   Temp 97.9 °F (36.6 °C) (Oral)   Resp 16   Ht 5' 9" (1.753 m)   Wt 82.3 kg (181 lb 8 oz)   SpO2 98%   BMI 26.80 kg/m²   General appearance: alert, appears stated age, and cooperative  Head: Normocephalic, without obvious abnormality, atraumatic  Eyes: conjunctivae/corneas clear. PERRL, EOM's intact. Fundi benign.  Ears: abnormal TM right ear - dull and abnormal TM left ear - dull  Nose: Nares normal. Septum midline. Mucosa normal. No drainage or sinus tenderness., moderate congestion  Throat: abnormal findings: mild oropharyngeal erythema and PND  Lungs: clear to auscultation bilaterally  Heart: regular rate and rhythm, S1, S2 normal, no murmur, click, rub or gallop  Extremities: extremities normal, atraumatic, no cyanosis or edema  Skin: Skin color, texture, turgor normal. No rashes or lesions  Lymph nodes: Cervical, supraclavicular, and axillary nodes normal.  Neurologic: Alert and oriented X 3, normal strength and tone. Normal symmetric reflexes. Normal coordination and gait     Assessment:      viral upper respiratory illness     Plan:      Discussed diagnosis and treatment of URI.  Suggested symptomatic OTC remedies.  Nasal saline spray for congestion.  Follow up as needed.   "

## 2023-12-11 ENCOUNTER — PATIENT MESSAGE (OUTPATIENT)
Dept: RHEUMATOLOGY | Facility: CLINIC | Age: 53
End: 2023-12-11
Payer: COMMERCIAL

## 2023-12-11 DIAGNOSIS — M45.9 ANKYLOSING SPONDYLITIS, UNSPECIFIED SITE OF SPINE: ICD-10-CM

## 2023-12-11 RX ORDER — IXEKIZUMAB 80 MG/ML
INJECTION, SOLUTION SUBCUTANEOUS
Qty: 2 ML | Refills: 6 | Status: SHIPPED | OUTPATIENT
Start: 2023-12-11

## 2023-12-11 NOTE — TELEPHONE ENCOUNTER
Fax from [facility] requesting refill of Taltz. Please advise, looks like we were possibly going to be switching pt to Cimzia after 6 months of Taltz use

## 2024-04-23 ENCOUNTER — HOSPITAL ENCOUNTER (OUTPATIENT)
Dept: RADIOLOGY | Facility: HOSPITAL | Age: 54
Discharge: HOME | End: 2024-04-23
Attending: OBSTETRICS & GYNECOLOGY
Payer: COMMERCIAL

## 2024-04-23 VITALS — HEIGHT: 69 IN | WEIGHT: 185 LBS | BODY MASS INDEX: 27.4 KG/M2

## 2024-04-23 DIAGNOSIS — Z12.31 BREAST CANCER SCREENING BY MAMMOGRAM: ICD-10-CM

## 2024-04-23 PROCEDURE — 77067 SCR MAMMO BI INCL CAD: CPT | Mod: TC

## 2024-12-10 ENCOUNTER — OFFICE VISIT (OUTPATIENT)
Dept: FAMILY MEDICINE | Facility: CLINIC | Age: 54
End: 2024-12-10
Payer: COMMERCIAL

## 2024-12-10 VITALS
SYSTOLIC BLOOD PRESSURE: 137 MMHG | HEIGHT: 69 IN | BODY MASS INDEX: 28.84 KG/M2 | WEIGHT: 194.69 LBS | OXYGEN SATURATION: 98 % | HEART RATE: 71 BPM | DIASTOLIC BLOOD PRESSURE: 82 MMHG

## 2024-12-10 DIAGNOSIS — Z15.89 HLA-B27 POSITIVE: ICD-10-CM

## 2024-12-10 DIAGNOSIS — M45.9 ANKYLOSING SPONDYLITIS, UNSPECIFIED SITE OF SPINE: Primary | ICD-10-CM

## 2024-12-10 PROCEDURE — 1159F MED LIST DOCD IN RCRD: CPT | Mod: ,,, | Performed by: NURSE PRACTITIONER

## 2024-12-10 PROCEDURE — 99213 OFFICE O/P EST LOW 20 MIN: CPT | Mod: 25,,, | Performed by: NURSE PRACTITIONER

## 2024-12-10 PROCEDURE — 3008F BODY MASS INDEX DOCD: CPT | Mod: ,,, | Performed by: NURSE PRACTITIONER

## 2024-12-10 PROCEDURE — 3075F SYST BP GE 130 - 139MM HG: CPT | Mod: ,,, | Performed by: NURSE PRACTITIONER

## 2024-12-10 PROCEDURE — 3079F DIAST BP 80-89 MM HG: CPT | Mod: ,,, | Performed by: NURSE PRACTITIONER

## 2024-12-10 PROCEDURE — 1160F RVW MEDS BY RX/DR IN RCRD: CPT | Mod: ,,, | Performed by: NURSE PRACTITIONER

## 2024-12-10 PROCEDURE — 96372 THER/PROPH/DIAG INJ SC/IM: CPT | Mod: ,,, | Performed by: NURSE PRACTITIONER

## 2024-12-10 RX ORDER — METHYLPREDNISOLONE ACETATE 40 MG/ML
40 INJECTION, SUSPENSION INTRA-ARTICULAR; INTRALESIONAL; INTRAMUSCULAR; SOFT TISSUE
Status: COMPLETED | OUTPATIENT
Start: 2024-12-10 | End: 2024-12-10

## 2024-12-10 RX ORDER — DEXAMETHASONE SODIUM PHOSPHATE 4 MG/ML
4 INJECTION, SOLUTION INTRA-ARTICULAR; INTRALESIONAL; INTRAMUSCULAR; INTRAVENOUS; SOFT TISSUE
Status: COMPLETED | OUTPATIENT
Start: 2024-12-10 | End: 2024-12-10

## 2024-12-10 RX ADMIN — DEXAMETHASONE SODIUM PHOSPHATE 4 MG: 4 INJECTION, SOLUTION INTRA-ARTICULAR; INTRALESIONAL; INTRAMUSCULAR; INTRAVENOUS; SOFT TISSUE at 01:12

## 2024-12-10 RX ADMIN — METHYLPREDNISOLONE ACETATE 40 MG: 40 INJECTION, SUSPENSION INTRA-ARTICULAR; INTRALESIONAL; INTRAMUSCULAR; SOFT TISSUE at 01:12

## 2024-12-10 NOTE — PROGRESS NOTES
Clinic note     Patient name: Benita Alston is a 54 y.o. female   Chief compliant   Chief Complaint   Patient presents with    Arthritis     AS arthritis flare up. Is asking for a steroid shot.        Subjective     History of present illness   PCP: ANNA Wilson   In clinic for evaluation of arthritis flare up with lower back and bilateral hip pain which started a couple weeks ago   Request IM steroid which is usually effective     Past Medical History: GERD, anxiety, ankylosing spondylitis HLA-B27 positive     Previously followed by Dr Otero for rheumatology   Currently followed by ANNA Mccoy              Social History     Tobacco Use    Smoking status: Never    Smokeless tobacco: Never   Substance Use Topics    Alcohol use: Never    Drug use: Never       Review of patient's allergies indicates:  No Known Allergies    Past Medical History:   Diagnosis Date    Ankylosing spondylitis 09/11/2023    GERD (gastroesophageal reflux disease)        Past Surgical History:   Procedure Laterality Date    HYSTERECTOMY      SPINE SURGERY      TUBAL LIGATION          Family History   Problem Relation Name Age of Onset    Breast cancer Mother Kayla Fortune     Hypertension Mother Kayla Fortune     Cancer Mother Kayla Fortune     Diabetes Mellitus Father FAN Fortune     Diabetes Father FAN Fortune     Arthritis Maternal Grandfather [place]          Current Outpatient Medications:     clorazepate (TRANXENE) 3.75 MG Tab, TAKE 1 TABLET BY MOUTH 2 TIMES EVERY DAY AS NEEDED, Disp: , Rfl:     gabapentin (NEURONTIN) 300 MG capsule, Take 300 mg by mouth nightly., Disp: , Rfl:     meloxicam (MOBIC) 15 MG tablet, Take 15 mg by mouth once daily., Disp: , Rfl:     pantoprazole (PROTONIX) 40 MG tablet, Take 40 mg by mouth., Disp: , Rfl:     sertraline (ZOLOFT) 100 MG tablet, Take 100 mg by mouth., Disp: , Rfl:     Current Facility-Administered Medications:     dexAMETHasone injection 4 mg, 4 mg, Intramuscular, 1 time in Clinic/HOD, Yris Patel  "NETTIE FNP    methylPREDNISolone acetate injection 40 mg, 40 mg, Intramuscular, 1 time in Clinic/HOD, Yris Patel NETTIE, FNP    Review of Systems   Constitutional:  Negative for appetite change, chills, fatigue, fever and unexpected weight change.   Respiratory:  Negative for cough and shortness of breath.    Cardiovascular:  Negative for chest pain, palpitations and leg swelling.   Gastrointestinal:  Negative for abdominal pain, change in bowel habit, constipation, diarrhea, nausea and vomiting.   Genitourinary:  Negative for dysuria and frequency.   Musculoskeletal:  Positive for arthralgias and back pain. Negative for gait problem and myalgias.   Neurological:  Negative for dizziness, syncope, light-headedness and headaches.   Psychiatric/Behavioral:  Negative for dysphoric mood and sleep disturbance. The patient is not nervous/anxious.        Objective     /82   Pulse 71   Ht 5' 9" (1.753 m)   Wt 88.3 kg (194 lb 11.2 oz)   SpO2 98%   BMI 28.75 kg/m²     Physical Exam   Constitutional: She is oriented to person, place, and time. No distress.   HENT:   Head: Atraumatic.   Mouth/Throat: Mucous membranes are moist.   Cardiovascular: Normal rate and regular rhythm. Pulmonary:      Effort: Pulmonary effort is normal. No respiratory distress.      Breath sounds: Normal breath sounds. No wheezing, rhonchi or rales.     Abdominal: Soft. Bowel sounds are normal. She exhibits no distension. There is no abdominal tenderness.   Musculoskeletal:         General: Normal range of motion.      Cervical back: Neck supple.      Right lower leg: No edema.      Left lower leg: No edema.   Neurological: She is alert and oriented to person, place, and time. Gait normal.   Skin: Skin is warm and dry.   Psychiatric: Her behavior is normal. Mood normal.       Lab Results   Component Value Date    WBC 9.21 09/11/2023    HGB 13.5 09/11/2023    HCT 39.7 09/11/2023    MCV 85.9 09/11/2023     09/11/2023       CMP  Sodium   Date " "Value Ref Range Status   07/05/2023 139 136 - 145 mmol/L Final     Potassium   Date Value Ref Range Status   07/05/2023 3.6 3.5 - 5.1 mmol/L Final     Chloride   Date Value Ref Range Status   07/05/2023 106 98 - 107 mmol/L Final     CO2   Date Value Ref Range Status   07/05/2023 28 21 - 32 mmol/L Final     Glucose   Date Value Ref Range Status   07/05/2023 80 74 - 106 mg/dL Final     BUN   Date Value Ref Range Status   07/05/2023 8 7 - 18 mg/dL Final     Creatinine   Date Value Ref Range Status   07/05/2023 0.89 0.55 - 1.02 mg/dL Final     Calcium   Date Value Ref Range Status   07/05/2023 8.7 8.5 - 10.1 mg/dL Final     Total Protein   Date Value Ref Range Status   07/05/2023 7.3 6.4 - 8.2 g/dL Final     Albumin   Date Value Ref Range Status   07/05/2023 3.8 3.5 - 5.0 g/dL Final     Bilirubin, Total   Date Value Ref Range Status   07/05/2023 0.7 >0.0 - 1.2 mg/dL Final     Alk Phos   Date Value Ref Range Status   07/05/2023 87 41 - 108 U/L Final     AST   Date Value Ref Range Status   09/11/2023 21 15 - 37 U/L Final     ALT   Date Value Ref Range Status   09/11/2023 29 13 - 56 U/L Final     Anion Gap   Date Value Ref Range Status   07/05/2023 9 7 - 16 mmol/L Final     eGFR    Date Value Ref Range Status   03/16/2020 65       Comment:     The above 10 analytes were performed by [id number] [place] [id number]     eGFR   Date Value Ref Range Status   03/16/2020 54       No results found for: "TSH"  No results found for: "CHOL"  No results found for: "HDL"  No results found for: "LDLCALC"  No results found for: "TRIG"  No results found for: "CHOLHDL"  No results found for: "LABA1C", "HGBA1C"      Assessment and Plan   Ankylosing spondylitis, unspecified site of spine  -     methylPREDNISolone acetate injection 40 mg  -     dexAMETHasone injection 4 mg    HLA-B27 positive  -     methylPREDNISolone acetate injection 40 mg  -     dexAMETHasone injection 4 mg          Patient " Instructions  Patient Instructions   Follow up in clinic as needed

## 2025-04-29 ENCOUNTER — HOSPITAL ENCOUNTER (OUTPATIENT)
Dept: RADIOLOGY | Facility: HOSPITAL | Age: 55
Discharge: HOME | End: 2025-04-29
Attending: NURSE PRACTITIONER
Payer: COMMERCIAL

## 2025-04-29 DIAGNOSIS — Z12.31 OTHER SCREENING MAMMOGRAM: ICD-10-CM

## 2025-04-29 PROCEDURE — 77063 BREAST TOMOSYNTHESIS BI: CPT | Mod: 26,,, | Performed by: RADIOLOGY

## 2025-04-29 PROCEDURE — 77067 SCR MAMMO BI INCL CAD: CPT | Mod: 26,,, | Performed by: RADIOLOGY

## 2025-04-29 PROCEDURE — 77063 BREAST TOMOSYNTHESIS BI: CPT | Mod: TC

## 2025-05-04 ENCOUNTER — OFFICE VISIT (OUTPATIENT)
Dept: FAMILY MEDICINE | Facility: CLINIC | Age: 55
End: 2025-05-04
Payer: COMMERCIAL

## 2025-05-04 VITALS
RESPIRATION RATE: 18 BRPM | SYSTOLIC BLOOD PRESSURE: 141 MMHG | HEIGHT: 69 IN | BODY MASS INDEX: 29.33 KG/M2 | HEART RATE: 88 BPM | OXYGEN SATURATION: 96 % | WEIGHT: 198 LBS | TEMPERATURE: 98 F | DIASTOLIC BLOOD PRESSURE: 65 MMHG

## 2025-05-04 DIAGNOSIS — R05.1 ACUTE COUGH: ICD-10-CM

## 2025-05-04 DIAGNOSIS — J01.90 ACUTE NON-RECURRENT SINUSITIS, UNSPECIFIED LOCATION: Primary | ICD-10-CM

## 2025-05-04 DIAGNOSIS — R52 BODY ACHES: ICD-10-CM

## 2025-05-04 DIAGNOSIS — R09.82 PND (POST-NASAL DRIP): ICD-10-CM

## 2025-05-04 LAB
CTP QC/QA: YES
POC MOLECULAR INFLUENZA A AGN: NEGATIVE
POC MOLECULAR INFLUENZA B AGN: NEGATIVE

## 2025-05-04 PROCEDURE — 1159F MED LIST DOCD IN RCRD: CPT | Mod: ,,, | Performed by: NURSE PRACTITIONER

## 2025-05-04 PROCEDURE — 1160F RVW MEDS BY RX/DR IN RCRD: CPT | Mod: ,,, | Performed by: NURSE PRACTITIONER

## 2025-05-04 PROCEDURE — 99051 MED SERV EVE/WKEND/HOLIDAY: CPT | Mod: ,,, | Performed by: NURSE PRACTITIONER

## 2025-05-04 PROCEDURE — 96372 THER/PROPH/DIAG INJ SC/IM: CPT | Mod: ,,, | Performed by: NURSE PRACTITIONER

## 2025-05-04 PROCEDURE — 3008F BODY MASS INDEX DOCD: CPT | Mod: ,,, | Performed by: NURSE PRACTITIONER

## 2025-05-04 PROCEDURE — 87502 INFLUENZA DNA AMP PROBE: CPT | Mod: QW,,, | Performed by: NURSE PRACTITIONER

## 2025-05-04 PROCEDURE — 99213 OFFICE O/P EST LOW 20 MIN: CPT | Mod: 25,,, | Performed by: NURSE PRACTITIONER

## 2025-05-04 PROCEDURE — 3078F DIAST BP <80 MM HG: CPT | Mod: ,,, | Performed by: NURSE PRACTITIONER

## 2025-05-04 PROCEDURE — 3077F SYST BP >= 140 MM HG: CPT | Mod: ,,, | Performed by: NURSE PRACTITIONER

## 2025-05-04 RX ORDER — METHYLPREDNISOLONE ACETATE 40 MG/ML
40 INJECTION, SUSPENSION INTRA-ARTICULAR; INTRALESIONAL; INTRAMUSCULAR; SOFT TISSUE
Status: COMPLETED | OUTPATIENT
Start: 2025-05-04 | End: 2025-05-04

## 2025-05-04 RX ORDER — CEFDINIR 300 MG/1
300 CAPSULE ORAL 2 TIMES DAILY
Qty: 20 CAPSULE | Refills: 0 | Status: SHIPPED | OUTPATIENT
Start: 2025-05-04 | End: 2025-05-14

## 2025-05-04 RX ORDER — DEXAMETHASONE SODIUM PHOSPHATE 4 MG/ML
4 INJECTION, SOLUTION INTRA-ARTICULAR; INTRALESIONAL; INTRAMUSCULAR; INTRAVENOUS; SOFT TISSUE
Status: COMPLETED | OUTPATIENT
Start: 2025-05-04 | End: 2025-05-04

## 2025-05-04 RX ORDER — CHLOPHEDIANOL HCL AND PYRILAMINE MALEATE 12.5; 12.5 MG/5ML; MG/5ML
10 SOLUTION ORAL 3 TIMES DAILY
Qty: 120 ML | Refills: 1 | Status: SHIPPED | OUTPATIENT
Start: 2025-05-04 | End: 2025-05-09

## 2025-05-04 RX ADMIN — DEXAMETHASONE SODIUM PHOSPHATE 4 MG: 4 INJECTION, SOLUTION INTRA-ARTICULAR; INTRALESIONAL; INTRAMUSCULAR; INTRAVENOUS; SOFT TISSUE at 04:05

## 2025-05-04 RX ADMIN — METHYLPREDNISOLONE ACETATE 40 MG: 40 INJECTION, SUSPENSION INTRA-ARTICULAR; INTRALESIONAL; INTRAMUSCULAR; SOFT TISSUE at 04:05

## 2025-05-04 NOTE — PROGRESS NOTES
Subjective:       Patient ID: Benita Alston is a 54 y.o. female.    Chief Complaint: Fever (100.7), Cough (Had for a couple of days ), and Generalized Body Aches (Aches started today)    Fever (100.7), Cough (Had for a couple of days ), and Generalized Body Aches (Aches started today)      Fever   Associated symptoms include congestion, coughing and a sore throat. Pertinent negatives include no abdominal pain, chest pain, ear pain, headaches, nausea, rash or vomiting. Her past medical history is not significant for immunocompromised state.   Cough  Associated symptoms include a fever, postnasal drip and a sore throat. Pertinent negatives include no chest pain, ear pain, headaches, rash or shortness of breath.     Review of Systems   Constitutional:  Positive for fever. Negative for appetite change and fatigue.   HENT:  Positive for nasal congestion, postnasal drip, sinus pressure/congestion and sore throat. Negative for ear pain.    Eyes:  Negative for pain, discharge and itching.   Respiratory:  Positive for cough. Negative for shortness of breath.    Cardiovascular:  Negative for chest pain and leg swelling.   Gastrointestinal:  Negative for abdominal pain, change in bowel habit, nausea and vomiting.   Musculoskeletal:  Negative for back pain, gait problem and neck pain.   Integumentary:  Negative for rash and wound.   Allergic/Immunologic: Negative for immunocompromised state.   Neurological:  Negative for dizziness, weakness and headaches.   All other systems reviewed and are negative.        Objective:      Physical Exam  Vitals and nursing note reviewed.   Constitutional:       General: She is not in acute distress.     Appearance: Normal appearance. She is not ill-appearing, toxic-appearing or diaphoretic.   HENT:      Head: Normocephalic.      Right Ear: Tympanic membrane, ear canal and external ear normal.      Left Ear: Tympanic membrane, ear canal and external ear normal.      Nose: Mucosal edema,  congestion and rhinorrhea present.      Right Turbinates: Swollen.      Left Turbinates: Swollen.      Mouth/Throat:      Mouth: Mucous membranes are moist.      Pharynx: Posterior oropharyngeal erythema and postnasal drip present. No oropharyngeal exudate.   Eyes:      General: No scleral icterus.        Right eye: No discharge.         Left eye: No discharge.      Extraocular Movements: Extraocular movements intact.      Conjunctiva/sclera: Conjunctivae normal.      Pupils: Pupils are equal, round, and reactive to light.   Cardiovascular:      Rate and Rhythm: Normal rate and regular rhythm.      Pulses: Normal pulses.      Heart sounds: Normal heart sounds. No murmur heard.  Pulmonary:      Effort: Pulmonary effort is normal. No respiratory distress.      Breath sounds: Normal breath sounds. No wheezing, rhonchi or rales.   Musculoskeletal:         General: Normal range of motion.      Cervical back: Neck supple. No tenderness.   Lymphadenopathy:      Cervical: No cervical adenopathy.   Skin:     General: Skin is warm and dry.      Capillary Refill: Capillary refill takes less than 2 seconds.      Findings: No rash.   Neurological:      Mental Status: She is alert and oriented to person, place, and time.   Psychiatric:         Mood and Affect: Mood normal.         Behavior: Behavior normal.         Thought Content: Thought content normal.         Judgment: Judgment normal.         Office Visit on 05/04/2025   Component Date Value Ref Range Status    POC Molecular Influenza A Ag 05/04/2025 Negative  Negative Final    POC Molecular Influenza B Ag 05/04/2025 Negative  Negative Final     Acceptable 05/04/2025 Yes   Final      Assessment:       1. Acute non-recurrent sinusitis, unspecified location    2. Body aches    3. PND (post-nasal drip)    4. Acute cough        Plan:   Acute non-recurrent sinusitis, unspecified location  -     dexAMETHasone injection 4 mg  -     methylPREDNISolone acetate  injection 40 mg  -     cefdinir (OMNICEF) 300 MG capsule; Take 1 capsule (300 mg total) by mouth 2 (two) times daily. for 10 days  Dispense: 20 capsule; Refill: 0  -     pyrilamine-chlophedianoL (NINJACOF) 12.5-12.5 mg/5 mL Liqd; Take 10 mLs by mouth 3 (three) times daily. for 5 days  Dispense: 120 mL; Refill: 1    Body aches  -     POCT Influenza A/B Molecular    PND (post-nasal drip)  -     dexAMETHasone injection 4 mg  -     methylPREDNISolone acetate injection 40 mg  -     pyrilamine-chlophedianoL (NINJACOF) 12.5-12.5 mg/5 mL Liqd; Take 10 mLs by mouth 3 (three) times daily. for 5 days  Dispense: 120 mL; Refill: 1    Acute cough  -     pyrilamine-chlophedianoL (NINJACOF) 12.5-12.5 mg/5 mL Liqd; Take 10 mLs by mouth 3 (three) times daily. for 5 days  Dispense: 120 mL; Refill: 1           Risks, benefits, and side effects were discussed with the patient. All questions were answered to the fullest satisfaction of the patient, and pt verbalized understanding and agreement to treatment plan. Pt was to call with any new or worsening symptoms, or present to the ER

## 2025-05-07 ENCOUNTER — OFFICE VISIT (OUTPATIENT)
Dept: FAMILY MEDICINE | Facility: CLINIC | Age: 55
End: 2025-05-07
Payer: COMMERCIAL

## 2025-05-07 VITALS
BODY MASS INDEX: 28.73 KG/M2 | DIASTOLIC BLOOD PRESSURE: 71 MMHG | TEMPERATURE: 99 F | HEIGHT: 69 IN | WEIGHT: 194 LBS | OXYGEN SATURATION: 96 % | RESPIRATION RATE: 20 BRPM | HEART RATE: 81 BPM | SYSTOLIC BLOOD PRESSURE: 118 MMHG

## 2025-05-07 DIAGNOSIS — J02.9 SORE THROAT: ICD-10-CM

## 2025-05-07 DIAGNOSIS — Z20.828 CONTACT WITH OR EXPOSURE TO VIRAL DISEASE: ICD-10-CM

## 2025-05-07 DIAGNOSIS — J40 BRONCHITIS: Primary | ICD-10-CM

## 2025-05-07 LAB
CTP QC/QA: YES
MOLECULAR STREP A: NEGATIVE
POC MOLECULAR INFLUENZA A AGN: NEGATIVE
POC MOLECULAR INFLUENZA B AGN: NEGATIVE
SARS-COV-2 RDRP RESP QL NAA+PROBE: NEGATIVE

## 2025-05-07 RX ORDER — PREDNISONE 20 MG/1
TABLET ORAL
Qty: 15 TABLET | Refills: 0 | Status: SHIPPED | OUTPATIENT
Start: 2025-05-07

## 2025-05-07 RX ORDER — LINCOMYCIN HYDROCHLORIDE 300 MG/ML
300 INJECTION, SOLUTION INTRAMUSCULAR; INTRAVENOUS; SUBCONJUNCTIVAL
Status: COMPLETED | OUTPATIENT
Start: 2025-05-07 | End: 2025-05-07

## 2025-05-07 RX ORDER — LEVOFLOXACIN 500 MG/1
500 TABLET, FILM COATED ORAL DAILY
Qty: 7 TABLET | Refills: 0 | Status: SHIPPED | OUTPATIENT
Start: 2025-05-07 | End: 2025-05-14

## 2025-05-07 RX ORDER — DEXAMETHASONE SODIUM PHOSPHATE 4 MG/ML
6 INJECTION, SOLUTION INTRA-ARTICULAR; INTRALESIONAL; INTRAMUSCULAR; INTRAVENOUS; SOFT TISSUE
Status: COMPLETED | OUTPATIENT
Start: 2025-05-07 | End: 2025-05-07

## 2025-05-07 RX ORDER — PROMETHAZINE HYDROCHLORIDE AND DEXTROMETHORPHAN HYDROBROMIDE 6.25; 15 MG/5ML; MG/5ML
5 SYRUP ORAL EVERY 4 HOURS PRN
Qty: 118 ML | Refills: 0 | Status: SHIPPED | OUTPATIENT
Start: 2025-05-07

## 2025-05-07 RX ADMIN — DEXAMETHASONE SODIUM PHOSPHATE 6 MG: 4 INJECTION, SOLUTION INTRA-ARTICULAR; INTRALESIONAL; INTRAMUSCULAR; INTRAVENOUS; SOFT TISSUE at 09:05

## 2025-05-07 RX ADMIN — LINCOMYCIN HYDROCHLORIDE 300 MG: 300 INJECTION, SOLUTION INTRAMUSCULAR; INTRAVENOUS; SUBCONJUNCTIVAL at 09:05

## 2025-05-07 NOTE — PROGRESS NOTES
Subjective:       Patient ID: Benita Alston is a 54 y.o. female.    Chief Complaint: COVID-19 Concerns (Cough, chest congestion, fever, sore throat. Patient states that she feels no better from Sundays's clinic visit.)    HPI  Review of Systems   Constitutional:  Negative for activity change, appetite change, chills, diaphoresis, fatigue, fever and unexpected weight change.   HENT:  Positive for nasal congestion, postnasal drip, rhinorrhea and sinus pressure/congestion. Negative for dental problem, drooling, ear discharge, ear pain, facial swelling, hearing loss, mouth sores, nosebleeds, sneezing, sore throat, tinnitus, trouble swallowing, voice change and goiter.    Eyes:  Negative for photophobia, discharge, itching and visual disturbance.   Respiratory:  Positive for cough. Negative for apnea, choking, chest tightness, shortness of breath, wheezing and stridor.    Cardiovascular:  Negative for chest pain, palpitations, leg swelling and claudication.   Gastrointestinal:  Negative for abdominal distention, abdominal pain, anal bleeding, blood in stool, change in bowel habit, constipation, diarrhea, nausea and vomiting.   Endocrine: Negative for cold intolerance, heat intolerance, polydipsia, polyphagia and polyuria.   Genitourinary:  Negative for bladder incontinence, decreased urine volume, difficulty urinating, dysuria, enuresis, flank pain, frequency, hematuria, nocturia, pelvic pain and urgency.   Musculoskeletal:  Negative for arthralgias, back pain, gait problem, joint swelling, leg pain, myalgias, neck pain, neck stiffness and joint deformity.   Integumentary:  Negative for pallor, rash, wound, breast mass and breast tenderness.   Allergic/Immunologic: Negative for environmental allergies, food allergies and immunocompromised state.   Neurological:  Negative for dizziness, vertigo, tremors, seizures, syncope, facial asymmetry, speech difficulty, weakness, light-headedness, numbness, headaches, coordination  difficulties and memory loss.   Hematological:  Negative for adenopathy. Does not bruise/bleed easily.   Psychiatric/Behavioral:  Negative for agitation, behavioral problems, confusion, decreased concentration, dysphoric mood, hallucinations, self-injury, sleep disturbance and suicidal ideas. The patient is not nervous/anxious and is not hyperactive.    Breast: Negative for mass and tenderness        Objective:      Physical Exam  Vitals reviewed.   Constitutional:       Appearance: Normal appearance.   HENT:      Head: Normocephalic and atraumatic.      Right Ear: Tympanic membrane, ear canal and external ear normal.      Left Ear: Tympanic membrane, ear canal and external ear normal.      Nose: Congestion and rhinorrhea present.      Mouth/Throat:      Mouth: Mucous membranes are moist.      Pharynx: Oropharynx is clear. Posterior oropharyngeal erythema present.   Eyes:      Extraocular Movements: Extraocular movements intact.      Conjunctiva/sclera: Conjunctivae normal.      Pupils: Pupils are equal, round, and reactive to light.   Cardiovascular:      Rate and Rhythm: Normal rate and regular rhythm.      Pulses: Normal pulses.      Heart sounds: Normal heart sounds.   Pulmonary:      Effort: Pulmonary effort is normal.      Breath sounds: Rhonchi present.   Abdominal:      General: Bowel sounds are normal.      Palpations: Abdomen is soft.   Musculoskeletal:         General: Normal range of motion.      Cervical back: Normal range of motion and neck supple.   Skin:     General: Skin is warm and dry.   Neurological:      General: No focal deficit present.      Mental Status: She is alert. Mental status is at baseline.   Psychiatric:         Mood and Affect: Mood normal.         Behavior: Behavior normal.         Thought Content: Thought content normal.         Judgment: Judgment normal.         Assessment:       1. Bronchitis    2. Sore throat    3. Contact with or exposure to viral disease        Plan:      Bronchitis  -     dexAMETHasone injection 6 mg  -     lincomycin injection 300 mg  -     predniSONE (DELTASONE) 20 MG tablet; Take 2 tablets by mouth daily x 5 days then take 1 tablet by mouth daily x 5 days  Dispense: 15 tablet; Refill: 0  -     levoFLOXacin (LEVAQUIN) 500 MG tablet; Take 1 tablet (500 mg total) by mouth once daily. for 7 days  Dispense: 7 tablet; Refill: 0  -     promethazine-dextromethorphan (PROMETHAZINE-DM) 6.25-15 mg/5 mL Syrp; Take 5 mLs by mouth every 4 (four) hours as needed.  Dispense: 118 mL; Refill: 0    Sore throat  -     POCT Strep A, Molecular    Contact with or exposure to viral disease  -     POCT COVID-19 Rapid Screening  -     POCT Influenza A/B Molecular